# Patient Record
Sex: FEMALE | Race: WHITE | NOT HISPANIC OR LATINO
[De-identification: names, ages, dates, MRNs, and addresses within clinical notes are randomized per-mention and may not be internally consistent; named-entity substitution may affect disease eponyms.]

---

## 2018-10-31 ENCOUNTER — TRANSCRIPTION ENCOUNTER (OUTPATIENT)
Age: 73
End: 2018-10-31

## 2019-11-29 ENCOUNTER — TRANSCRIPTION ENCOUNTER (OUTPATIENT)
Age: 74
End: 2019-11-29

## 2020-01-17 ENCOUNTER — TRANSCRIPTION ENCOUNTER (OUTPATIENT)
Age: 75
End: 2020-01-17

## 2023-01-03 ENCOUNTER — NON-APPOINTMENT (OUTPATIENT)
Age: 78
End: 2023-01-03

## 2023-01-04 ENCOUNTER — INPATIENT (INPATIENT)
Facility: HOSPITAL | Age: 78
LOS: 2 days | Discharge: HOME | End: 2023-01-07
Attending: HOSPITALIST | Admitting: HOSPITALIST
Payer: COMMERCIAL

## 2023-01-04 VITALS
DIASTOLIC BLOOD PRESSURE: 91 MMHG | RESPIRATION RATE: 18 BRPM | OXYGEN SATURATION: 97 % | SYSTOLIC BLOOD PRESSURE: 150 MMHG | HEART RATE: 130 BPM | WEIGHT: 169.98 LBS | TEMPERATURE: 97 F

## 2023-01-04 LAB
ALBUMIN SERPL ELPH-MCNC: 5 G/DL — SIGNIFICANT CHANGE UP (ref 3.5–5.2)
ALP SERPL-CCNC: 117 U/L — HIGH (ref 30–115)
ALT FLD-CCNC: 34 U/L — SIGNIFICANT CHANGE UP (ref 0–41)
ANION GAP SERPL CALC-SCNC: 13 MMOL/L — SIGNIFICANT CHANGE UP (ref 7–14)
AST SERPL-CCNC: 23 U/L — SIGNIFICANT CHANGE UP (ref 0–41)
BASOPHILS # BLD AUTO: 0.05 K/UL — SIGNIFICANT CHANGE UP (ref 0–0.2)
BASOPHILS NFR BLD AUTO: 0.8 % — SIGNIFICANT CHANGE UP (ref 0–1)
BILIRUB SERPL-MCNC: 1.4 MG/DL — HIGH (ref 0.2–1.2)
BUN SERPL-MCNC: 17 MG/DL — SIGNIFICANT CHANGE UP (ref 10–20)
CALCIUM SERPL-MCNC: 9.9 MG/DL — SIGNIFICANT CHANGE UP (ref 8.4–10.4)
CHLORIDE SERPL-SCNC: 103 MMOL/L — SIGNIFICANT CHANGE UP (ref 98–110)
CO2 SERPL-SCNC: 26 MMOL/L — SIGNIFICANT CHANGE UP (ref 17–32)
CREAT SERPL-MCNC: 0.8 MG/DL — SIGNIFICANT CHANGE UP (ref 0.7–1.5)
EGFR: 77 ML/MIN/1.73M2 — SIGNIFICANT CHANGE UP
EOSINOPHIL # BLD AUTO: 0.17 K/UL — SIGNIFICANT CHANGE UP (ref 0–0.7)
EOSINOPHIL NFR BLD AUTO: 2.6 % — SIGNIFICANT CHANGE UP (ref 0–8)
FLUAV AG NPH QL: SIGNIFICANT CHANGE UP
FLUBV AG NPH QL: SIGNIFICANT CHANGE UP
GLUCOSE SERPL-MCNC: 120 MG/DL — HIGH (ref 70–99)
HCT VFR BLD CALC: 41 % — SIGNIFICANT CHANGE UP (ref 37–47)
HGB BLD-MCNC: 14.5 G/DL — SIGNIFICANT CHANGE UP (ref 12–16)
IMM GRANULOCYTES NFR BLD AUTO: 0.3 % — SIGNIFICANT CHANGE UP (ref 0.1–0.3)
LYMPHOCYTES # BLD AUTO: 2.04 K/UL — SIGNIFICANT CHANGE UP (ref 1.2–3.4)
LYMPHOCYTES # BLD AUTO: 31.1 % — SIGNIFICANT CHANGE UP (ref 20.5–51.1)
MCHC RBC-ENTMCNC: 30.1 PG — SIGNIFICANT CHANGE UP (ref 27–31)
MCHC RBC-ENTMCNC: 35.4 G/DL — SIGNIFICANT CHANGE UP (ref 32–37)
MCV RBC AUTO: 85.1 FL — SIGNIFICANT CHANGE UP (ref 81–99)
MONOCYTES # BLD AUTO: 0.54 K/UL — SIGNIFICANT CHANGE UP (ref 0.1–0.6)
MONOCYTES NFR BLD AUTO: 8.2 % — SIGNIFICANT CHANGE UP (ref 1.7–9.3)
NEUTROPHILS # BLD AUTO: 3.74 K/UL — SIGNIFICANT CHANGE UP (ref 1.4–6.5)
NEUTROPHILS NFR BLD AUTO: 57 % — SIGNIFICANT CHANGE UP (ref 42.2–75.2)
NRBC # BLD: 0 /100 WBCS — SIGNIFICANT CHANGE UP (ref 0–0)
NT-PROBNP SERPL-SCNC: 952 PG/ML — HIGH (ref 0–300)
PLATELET # BLD AUTO: 280 K/UL — SIGNIFICANT CHANGE UP (ref 130–400)
POTASSIUM SERPL-MCNC: 2.9 MMOL/L — LOW (ref 3.5–5)
POTASSIUM SERPL-SCNC: 2.9 MMOL/L — LOW (ref 3.5–5)
PROT SERPL-MCNC: 7.7 G/DL — SIGNIFICANT CHANGE UP (ref 6–8)
RBC # BLD: 4.82 M/UL — SIGNIFICANT CHANGE UP (ref 4.2–5.4)
RBC # FLD: 13.5 % — SIGNIFICANT CHANGE UP (ref 11.5–14.5)
RSV RNA NPH QL NAA+NON-PROBE: SIGNIFICANT CHANGE UP
SARS-COV-2 RNA SPEC QL NAA+PROBE: SIGNIFICANT CHANGE UP
SODIUM SERPL-SCNC: 142 MMOL/L — SIGNIFICANT CHANGE UP (ref 135–146)
TROPONIN T SERPL-MCNC: <0.01 NG/ML — SIGNIFICANT CHANGE UP
WBC # BLD: 6.56 K/UL — SIGNIFICANT CHANGE UP (ref 4.8–10.8)
WBC # FLD AUTO: 6.56 K/UL — SIGNIFICANT CHANGE UP (ref 4.8–10.8)

## 2023-01-04 PROCEDURE — 71045 X-RAY EXAM CHEST 1 VIEW: CPT | Mod: 26

## 2023-01-04 PROCEDURE — 99285 EMERGENCY DEPT VISIT HI MDM: CPT

## 2023-01-04 PROCEDURE — 93010 ELECTROCARDIOGRAM REPORT: CPT

## 2023-01-04 RX ORDER — SODIUM CHLORIDE 9 MG/ML
1000 INJECTION INTRAMUSCULAR; INTRAVENOUS; SUBCUTANEOUS ONCE
Refills: 0 | Status: COMPLETED | OUTPATIENT
Start: 2023-01-04 | End: 2023-01-04

## 2023-01-04 RX ORDER — DILTIAZEM HCL 120 MG
20 CAPSULE, EXT RELEASE 24 HR ORAL ONCE
Refills: 0 | Status: COMPLETED | OUTPATIENT
Start: 2023-01-04 | End: 2023-01-04

## 2023-01-04 RX ORDER — DILTIAZEM HCL 120 MG
30 CAPSULE, EXT RELEASE 24 HR ORAL ONCE
Refills: 0 | Status: COMPLETED | OUTPATIENT
Start: 2023-01-04 | End: 2023-01-04

## 2023-01-04 RX ORDER — POTASSIUM CHLORIDE 20 MEQ
20 PACKET (EA) ORAL
Refills: 0 | Status: COMPLETED | OUTPATIENT
Start: 2023-01-04 | End: 2023-01-05

## 2023-01-04 RX ORDER — POTASSIUM CHLORIDE 20 MEQ
40 PACKET (EA) ORAL ONCE
Refills: 0 | Status: COMPLETED | OUTPATIENT
Start: 2023-01-04 | End: 2023-01-04

## 2023-01-04 RX ORDER — DILTIAZEM HCL 120 MG
5 CAPSULE, EXT RELEASE 24 HR ORAL
Qty: 125 | Refills: 0 | Status: DISCONTINUED | OUTPATIENT
Start: 2023-01-04 | End: 2023-01-05

## 2023-01-04 RX ADMIN — Medication 5 MG/HR: at 21:10

## 2023-01-04 RX ADMIN — Medication 40 MILLIEQUIVALENT(S): at 23:38

## 2023-01-04 RX ADMIN — Medication 50 MILLIEQUIVALENT(S): at 23:34

## 2023-01-04 RX ADMIN — Medication 30 MILLIGRAM(S): at 21:00

## 2023-01-04 RX ADMIN — SODIUM CHLORIDE 1000 MILLILITER(S): 9 INJECTION INTRAMUSCULAR; INTRAVENOUS; SUBCUTANEOUS at 20:33

## 2023-01-04 RX ADMIN — Medication 20 MILLIGRAM(S): at 20:33

## 2023-01-04 NOTE — ED ADULT TRIAGE NOTE - CHIEF COMPLAINT QUOTE
pt biba complaining of shortness of breath for about a week now. pt went to the urgent care where they did an EKG and found her to be on a fib at 140 bpm

## 2023-01-04 NOTE — ED PROVIDER NOTE - ATTENDING CONTRIBUTION TO CARE
78 yo f hx htn  pt presents for eval of exertional sob. sx began several days ago. no cp/syncope.  no previous cardiac hx.  pt went to [facility] and was found to be in rapid afib to 140s and ref to ed. no reported thyroid disease, recent illness, leg swelling, pleuritic cp     vs tachycardic  gen- NAD, aaox3  card-irir  lungs-ctab, no wheezing or rhonchi  abd-sntnd, no guarding or rebound  neuro- full str/sensation, cn ii-xii grossly intact, normal coordination    new onset rapid afib  will treat w/ cardizem ivp > drip/oral pending response  BP stable  screening labs

## 2023-01-04 NOTE — ED PROVIDER NOTE - PHYSICAL EXAMINATION
CONSTITUTIONAL: nontoxic appearing, in no acute distress  HEAD:  normocephalic, atraumatic  EYES:  no conjunctival injection, no eye discharge, tracking well  ENT:  tympanic membranes intact bilaterally, moist mucous membranes, no oropharyngeal ulcerations or lesions, no tonsillar swelling or erythema, no tonsillar exudates  NECK:  supple, no masses, no tender anterior/posterior cervical lymphadenopathy  CV:  rapid afib, cap refill < 2 seconds  RESP:  normal respiratory effort, lungs clear to auscultation bilaterally, no wheezes, no crackles, no retractions, no stridor  ABD:  soft, nontender, nondistended, no masses, no organomegaly  LYMPH:  no significant lymphadenopathy  MSK/NEURO:  normal movement, normal tone  SKIN:  warm, dry, no rash

## 2023-01-04 NOTE — ED PROVIDER NOTE - CONSIDERATION OF ADMISSION OBSERVATION
Consideration of Admission/Observation ED observation period on telemetry as rate control is given  likely tele admission given new onset

## 2023-01-04 NOTE — ED PROVIDER NOTE - CLINICAL SUMMARY MEDICAL DECISION MAKING FREE TEXT BOX
rate improving w/ dilt pushes and drip  labs w/ hypoK, bnp elevated, xr congested  will admit to med tele for new onset afib, suspect new onset chf

## 2023-01-04 NOTE — ED PROVIDER NOTE - CARE PLAN
1 Principal Discharge DX:	New onset atrial fibrillation  Secondary Diagnosis:	Shortness of breath  Secondary Diagnosis:	Hypokalemia

## 2023-01-04 NOTE — ED PROVIDER NOTE - OBJECTIVE STATEMENT
77 y F pmhx htn 76 yo f hx htn presents for eval of exertional sob. pt states sxs started 2days ago after she received her flu/covid vaccines. pt denies cp/syncope. pt went to  and was found to be in rapid afib HR 140s and sent to ED for further work up. no reported thyroid disease, recent illness, leg swelling, pleuritic cp 76 yo f hx htn presents for eval of exertional sob. pt states sxs started 2days ago after she received her flu/covid vaccines. pt denies cp/syncope. pt went to  and was found to be in rapid afib HR 140s and sent to ED for further work up. no reported thyroid disease, recent illness, leg swelling, pleuritic cp

## 2023-01-05 LAB
A1C WITH ESTIMATED AVERAGE GLUCOSE RESULT: 6.1 % — HIGH (ref 4–5.6)
ALBUMIN SERPL ELPH-MCNC: 4.4 G/DL — SIGNIFICANT CHANGE UP (ref 3.5–5.2)
ALP SERPL-CCNC: 105 U/L — SIGNIFICANT CHANGE UP (ref 30–115)
ALT FLD-CCNC: 28 U/L — SIGNIFICANT CHANGE UP (ref 0–41)
ANION GAP SERPL CALC-SCNC: 16 MMOL/L — HIGH (ref 7–14)
AST SERPL-CCNC: 22 U/L — SIGNIFICANT CHANGE UP (ref 0–41)
BILIRUB DIRECT SERPL-MCNC: 0.3 MG/DL — SIGNIFICANT CHANGE UP (ref 0–0.3)
BILIRUB SERPL-MCNC: 1.4 MG/DL — HIGH (ref 0.2–1.2)
BUN SERPL-MCNC: 13 MG/DL — SIGNIFICANT CHANGE UP (ref 10–20)
CALCIUM SERPL-MCNC: 9.4 MG/DL — SIGNIFICANT CHANGE UP (ref 8.4–10.5)
CHLORIDE SERPL-SCNC: 105 MMOL/L — SIGNIFICANT CHANGE UP (ref 98–110)
CHOLEST SERPL-MCNC: 146 MG/DL — SIGNIFICANT CHANGE UP
CO2 SERPL-SCNC: 20 MMOL/L — SIGNIFICANT CHANGE UP (ref 17–32)
CREAT SERPL-MCNC: 0.7 MG/DL — SIGNIFICANT CHANGE UP (ref 0.7–1.5)
EGFR: 90 ML/MIN/1.73M2 — SIGNIFICANT CHANGE UP
ESTIMATED AVERAGE GLUCOSE: 128 MG/DL — HIGH (ref 68–114)
GLUCOSE SERPL-MCNC: 141 MG/DL — HIGH (ref 70–99)
HCT VFR BLD CALC: 37.8 % — SIGNIFICANT CHANGE UP (ref 37–47)
HCV AB S/CO SERPL IA: 0.2 COI — SIGNIFICANT CHANGE UP
HCV AB SERPL-IMP: SIGNIFICANT CHANGE UP
HDLC SERPL-MCNC: 52 MG/DL — SIGNIFICANT CHANGE UP
HGB BLD-MCNC: 13.5 G/DL — SIGNIFICANT CHANGE UP (ref 12–16)
LIPID PNL WITH DIRECT LDL SERPL: 69 MG/DL — SIGNIFICANT CHANGE UP
MAGNESIUM SERPL-MCNC: 1.8 MG/DL — SIGNIFICANT CHANGE UP (ref 1.8–2.4)
MCHC RBC-ENTMCNC: 30.1 PG — SIGNIFICANT CHANGE UP (ref 27–31)
MCHC RBC-ENTMCNC: 35.7 G/DL — SIGNIFICANT CHANGE UP (ref 32–37)
MCV RBC AUTO: 84.4 FL — SIGNIFICANT CHANGE UP (ref 81–99)
NON HDL CHOLESTEROL: 94 MG/DL — SIGNIFICANT CHANGE UP
NRBC # BLD: 0 /100 WBCS — SIGNIFICANT CHANGE UP (ref 0–0)
PLATELET # BLD AUTO: 235 K/UL — SIGNIFICANT CHANGE UP (ref 130–400)
POTASSIUM SERPL-MCNC: 3.9 MMOL/L — SIGNIFICANT CHANGE UP (ref 3.5–5)
POTASSIUM SERPL-SCNC: 3.9 MMOL/L — SIGNIFICANT CHANGE UP (ref 3.5–5)
PROT SERPL-MCNC: 6.9 G/DL — SIGNIFICANT CHANGE UP (ref 6–8)
RBC # BLD: 4.48 M/UL — SIGNIFICANT CHANGE UP (ref 4.2–5.4)
RBC # FLD: 13.5 % — SIGNIFICANT CHANGE UP (ref 11.5–14.5)
SODIUM SERPL-SCNC: 141 MMOL/L — SIGNIFICANT CHANGE UP (ref 135–146)
TRIGL SERPL-MCNC: 123 MG/DL — SIGNIFICANT CHANGE UP
TSH SERPL-MCNC: 4.79 UIU/ML — HIGH (ref 0.27–4.2)
WBC # BLD: 6.4 K/UL — SIGNIFICANT CHANGE UP (ref 4.8–10.8)
WBC # FLD AUTO: 6.4 K/UL — SIGNIFICANT CHANGE UP (ref 4.8–10.8)

## 2023-01-05 PROCEDURE — 93312 ECHO TRANSESOPHAGEAL: CPT | Mod: 26,XU

## 2023-01-05 PROCEDURE — 93320 DOPPLER ECHO COMPLETE: CPT | Mod: 26

## 2023-01-05 PROCEDURE — 93325 DOPPLER ECHO COLOR FLOW MAPG: CPT | Mod: 26

## 2023-01-05 PROCEDURE — 99223 1ST HOSP IP/OBS HIGH 75: CPT

## 2023-01-05 PROCEDURE — 92960 CARDIOVERSION ELECTRIC EXT: CPT

## 2023-01-05 RX ORDER — LATANOPROST 0.05 MG/ML
1 SOLUTION/ DROPS OPHTHALMIC; TOPICAL AT BEDTIME
Refills: 0 | Status: DISCONTINUED | OUTPATIENT
Start: 2023-01-05 | End: 2023-01-07

## 2023-01-05 RX ORDER — ENOXAPARIN SODIUM 100 MG/ML
80 INJECTION SUBCUTANEOUS EVERY 12 HOURS
Refills: 0 | Status: DISCONTINUED | OUTPATIENT
Start: 2023-01-05 | End: 2023-01-06

## 2023-01-05 RX ORDER — LOSARTAN POTASSIUM 100 MG/1
100 TABLET, FILM COATED ORAL DAILY
Refills: 0 | Status: DISCONTINUED | OUTPATIENT
Start: 2023-01-05 | End: 2023-01-07

## 2023-01-05 RX ORDER — LANOLIN ALCOHOL/MO/W.PET/CERES
3 CREAM (GRAM) TOPICAL AT BEDTIME
Refills: 0 | Status: DISCONTINUED | OUTPATIENT
Start: 2023-01-05 | End: 2023-01-07

## 2023-01-05 RX ORDER — AMLODIPINE BESYLATE 2.5 MG/1
5 TABLET ORAL DAILY
Refills: 0 | Status: DISCONTINUED | OUTPATIENT
Start: 2023-01-05 | End: 2023-01-05

## 2023-01-05 RX ORDER — ACETAMINOPHEN 500 MG
650 TABLET ORAL EVERY 6 HOURS
Refills: 0 | Status: DISCONTINUED | OUTPATIENT
Start: 2023-01-05 | End: 2023-01-07

## 2023-01-05 RX ORDER — LATANOPROST 0.05 MG/ML
1 SOLUTION/ DROPS OPHTHALMIC; TOPICAL AT BEDTIME
Refills: 0 | Status: DISCONTINUED | OUTPATIENT
Start: 2023-01-05 | End: 2023-01-05

## 2023-01-05 RX ORDER — DILTIAZEM HCL 120 MG
15 CAPSULE, EXT RELEASE 24 HR ORAL
Qty: 125 | Refills: 0 | Status: DISCONTINUED | OUTPATIENT
Start: 2023-01-05 | End: 2023-01-06

## 2023-01-05 RX ORDER — FUROSEMIDE 40 MG
40 TABLET ORAL DAILY
Refills: 0 | Status: DISCONTINUED | OUTPATIENT
Start: 2023-01-05 | End: 2023-01-06

## 2023-01-05 RX ORDER — DILTIAZEM HCL 120 MG
60 CAPSULE, EXT RELEASE 24 HR ORAL THREE TIMES A DAY
Refills: 0 | Status: DISCONTINUED | OUTPATIENT
Start: 2023-01-05 | End: 2023-01-05

## 2023-01-05 RX ORDER — AMIODARONE HYDROCHLORIDE 400 MG/1
1 TABLET ORAL
Qty: 900 | Refills: 0 | Status: DISCONTINUED | OUTPATIENT
Start: 2023-01-05 | End: 2023-01-06

## 2023-01-05 RX ORDER — ENOXAPARIN SODIUM 100 MG/ML
80 INJECTION SUBCUTANEOUS EVERY 12 HOURS
Refills: 0 | Status: DISCONTINUED | OUTPATIENT
Start: 2023-01-05 | End: 2023-01-05

## 2023-01-05 RX ORDER — AMIODARONE HYDROCHLORIDE 400 MG/1
0.5 TABLET ORAL
Qty: 900 | Refills: 0 | Status: DISCONTINUED | OUTPATIENT
Start: 2023-01-05 | End: 2023-01-06

## 2023-01-05 RX ORDER — ATORVASTATIN CALCIUM 80 MG/1
20 TABLET, FILM COATED ORAL AT BEDTIME
Refills: 0 | Status: DISCONTINUED | OUTPATIENT
Start: 2023-01-05 | End: 2023-01-07

## 2023-01-05 RX ADMIN — Medication 15 MG/HR: at 07:27

## 2023-01-05 RX ADMIN — AMIODARONE HYDROCHLORIDE 33.3 MG/MIN: 400 TABLET ORAL at 18:15

## 2023-01-05 RX ADMIN — Medication 650 MILLIGRAM(S): at 05:29

## 2023-01-05 RX ADMIN — Medication 50 MILLIEQUIVALENT(S): at 05:20

## 2023-01-05 RX ADMIN — ENOXAPARIN SODIUM 80 MILLIGRAM(S): 100 INJECTION SUBCUTANEOUS at 05:21

## 2023-01-05 RX ADMIN — ATORVASTATIN CALCIUM 20 MILLIGRAM(S): 80 TABLET, FILM COATED ORAL at 21:29

## 2023-01-05 RX ADMIN — ENOXAPARIN SODIUM 80 MILLIGRAM(S): 100 INJECTION SUBCUTANEOUS at 18:15

## 2023-01-05 RX ADMIN — LOSARTAN POTASSIUM 100 MILLIGRAM(S): 100 TABLET, FILM COATED ORAL at 05:21

## 2023-01-05 RX ADMIN — Medication 15 MG/HR: at 02:37

## 2023-01-05 RX ADMIN — Medication 60 MILLIGRAM(S): at 05:20

## 2023-01-05 RX ADMIN — LATANOPROST 1 DROP(S): 0.05 SOLUTION/ DROPS OPHTHALMIC; TOPICAL at 21:26

## 2023-01-05 RX ADMIN — Medication 40 MILLIGRAM(S): at 05:20

## 2023-01-05 RX ADMIN — Medication 50 MILLIEQUIVALENT(S): at 02:37

## 2023-01-05 NOTE — H&P ADULT - NSHPPOAURINARYCATHETER_GEN_ALL_CORE
Did she test + for COVID?  Is she having fever.  If she is more than 10 days out from when she was tested, and not having fever, then have her see me this week.  If she hasn't been tested, then probably should go through urgent care.    no

## 2023-01-05 NOTE — H&P ADULT - HISTORY OF PRESENT ILLNESS
This is a 77-year-old female patient with history of HTN, DL, and glaucoma presenting for 1 week history of shortness of breath, Last week she had the flu short and Covid booster at the same time, after that she started having SOB both at rest and on exertion. She denies any prior cardiac or pulm history. She's a non smoker, denies alcohol or drug hx, No chest pain, no palpitation, no other associated symptoms.     In the ED she was found to have new onset Afib with RVR, s/p Cardizem pushes and started on cardizem drip.  Vitals otherwise stable  CXR on wet read showed bilateral pleural effusion  EKG: Afib with RVR with non specific ST and T wave abnormalities   trops negative  Labs showed: K2.9, Bilirubin 1.4, , Pro-BNP 900s    Patient admitted to [facility] for monitoring and management of new onset Afib and r/o acute CHF. This is a 77-year-old female patient with history of HTN, DL, and glaucoma presenting for 1 week history of shortness of breath, Last week she had the flu vaccine and Covid booster at the same time, after that she started having SOB both at rest and on exertion. She denies any prior cardiac or pulm history. She's a non smoker, denies alcohol or drug hx, No cough, fever, chest pain, palpitations, or any other symptom.     In the ED she was found to have new onset Afib with RVR, s/p Cardizem pushes and started on cardizem drip.  Vitals otherwise stable  CXR on wet read showed bilateral pleural effusion  EKG: Afib with RVR with non specific ST and T wave abnormalities   trops negative  Labs showed: K2.9, Bilirubin 1.4, , Pro-BNP 900s    Patient admitted to [facility] for monitoring and management of new onset Afib and r/o acute CHF.

## 2023-01-05 NOTE — CHART NOTE - NSCHARTNOTEFT_GEN_A_CORE
POST OPERATIVE PROCEDURAL DOCUMENTATION  PRE-OP DIAGNOSIS: Afib with RVR    POST-OP DIAGNOSIS: Afib with successful DCCV to NSR    PROCEDURE: Transesophageal echocardiogram    Primary Physician: Dr. Stoddard  Assistant: Dr. Navarro    ANESTHESIA TYPE  [  ] General Anesthesia  [ x ] Conscious Sedation  [  ] Local/Regional    CONDITION  [  ] Critical  [  ] Serious  [  ] Fair  [ X ] Good    SPECIMENS REMOVED (IF APPLICABLE): N/A    IMPLANTS (IF APPLICABLE): None    ESTIMATED BLOOD LOSS: None    COMPLICATIONS: None      FINDINGS:    After risks and benefits of procedures were explained, informed consent was obtained and placed in chart.   The patient received topical anesthestic to the oropharynx with viscous lidocaine and benzocaine spray.  Refer to Anesthesia note for sedation details.  The DAT probe was passed into the esophagus without difficulty.  Transesophageal images were obtained.  The DAT probe was removed without difficulty and examined.  There was no evidence for bleeding.  The patient tolerated the procedure well without any immediate DAT-related complications.      Preliminary Findings:  PRABHA: Left atrial appendage was clear of clot and smoke.  LA/RA: Biatrial enlargement L>R b  LV: LVEF was estimated at 50%  MV: Moderate MR with some prolapse of P2, No evidence for MS.   AV: Moderate AI, no evidence for AS.   TV: Moderate TR.   IAS: ASD present with L->R shunt.  Trace pericardial effusion  L pleural effusion   There was mild, non-mobile atheroma seen in the thoracic aorta.     Patient successfully converted to sinus rhythm with synchronized  200 J x 2 of direct current cardioversion and amiodarone 150mg IVP    DIAGNOSIS/IMPRESSION: Afib with successful DCCV to NSR    PLAN OF CARE:  - s/p amiodarone 150mg IVP x 1. Will need to cont amiodarone loading  - CHADVASC 4. Will need to cont Anticoagulation  - F/U with EP for further recommendation  - Will need general cardiology follow up for ASD and valvular disease

## 2023-01-05 NOTE — H&P ADULT - ASSESSMENT
This is a 77-year-old female patient with history of HTN, DL, and glaucoma presenting for 1 week history of shortness of breath, Last week she had the flu short and Covid booster at the same time, after that she started having SOB both at rest and on exertion.  Patient admitted to [facility] for monitoring and management of new onset Afib and r/o acute CHF.      Plan:    #SOB  #New onset Afib with RVR  - s/p Cardizem pushes and drip at 15mg/hr in the ED--> transition to PO cardizem and wean off drip  - CHADSVASC: 4   - HAS-BLED: 1  - AC --> started lovenox 80 BID  - f/u TTE  - f/u TSH  - f/u EP consult      #r/o Acute CHF  - patient doesn't have a cardiac hx  - CXR on admission showed bilateral pleural effusion on wet read   - Pro BNP 900s  - started lasix 40 IV qd  - TTE ordered. If wall motion abnormalities found consider ischemic workup  - Accurate Is/Os and daily weight    #hypokalemia  - patient reports having chronic hypokalemia  - replete as necessary      #HTN  - Cw home amlodipine 5 daily, Losartan 100,   - holding HTZ in the setting of hypokalemia     #DL  - cw home atorvastatin 20 dailu  - f/u am Lipid profil and A1c    #Glaucoma  - Cw lumigan once daily  -o/p f/u      #MISC  - DVT PPx: Lovenox 80 BID  - GI PPx: low risk  - Diet: NPO until evaluated by EP   - Activity: IAT  - Code: Full code   - Dispo: Acute. Med [place]. This is a 77-year-old female patient with history of HTN, DL, and glaucoma presenting for 1 week history of shortness of breath, Last week she had the flu short and Covid booster at the same time, after that she started having SOB both at rest and on exertion.  Patient admitted to [facility] for monitoring and management of new onset Afib and r/o acute CHF.      Plan:    #SOB  #New onset Afib with RVR  - s/p Cardizem pushes and drip at 15mg/hr in the ED--> transition to PO cardizem and wean off drip  - CHADSVASC: 4   - HAS-BLED: 1  - AC --> started lovenox 80 BID  - f/u TTE  - f/u TSH  - f/u EP consult      #r/o Acute CHF  - patient doesn't have a cardiac hx  - CXR on admission showed bilateral pleural effusion on wet read   - Pro BNP 900s  - started lasix 40 IV qd  - no prior echo. TTE ordered. If wall motion abnormalities found consider ischemic workup  - Accurate Is/Os and daily weight    #Hypokalemia  - patient reports having chronic hypokalemia  - replete as necessary    #Elevated Total bilirubin and ALP  - trend LFTs. f/u direct bili  - patient asx.   - If trending up--> RUQ US      #HTN  #DL  - Cw home amlodipine 5 daily, Losartan 100,   - holding HTZ in the setting of hypokalemia   - cw home atorvastatin 20 dailu  - f/u am Lipid profil and A1c      #Glaucoma  - Cw lumigan once daily  -o/p f/u      #MISC  - DVT PPx: Lovenox 80 BID  - GI PPx: low risk  - Diet: NPO until evaluated by EP   - Activity: IAT  - Code: Full code   - Dispo: Acute. Med tele. This is a 77-year-old female patient with history of HTN, DL, and glaucoma presenting for 1 week history of shortness of breath, Last week she had the flu short and Covid booster at the same time, after that she started having SOB both at rest and on exertion.  Patient admitted to [facility] for monitoring and management of new onset Afib and r/o acute CHF.      Plan:    #SOB  #New onset Afib with RVR  - s/p Cardizem pushes and drip at 15mg/hr in the ED--> transition to PO Cardizem 60 tid (increase as needed) and wean off drip  - CHADSVASC: 4   - HAS-BLED: 1  - AC --> started lovenox 80 BID  - f/u TTE  - f/u TSH  - f/u EP consult      #r/o Acute CHF  - patient doesn't have a cardiac PMH.   - CXR on admission showed bilateral pleural effusion on wet read   - Pro BNP 900s  - started lasix 40 IV qd  - no prior echo. TTE ordered. If wall motion abnormalities found consider ischemic workup.  - Accurate Is/Os and daily weight      #Hypokalemia  - patient reports having chronic hypokalemia, never treated. replete as necessary    #Elevated Total bilirubin and ALP  - trend LFTs. f/u direct bili  - patient asx.   - If trending up or patient becomes symptomatic --> RUQ US      #HTN  #DL  - Cw home Losartan 100,   - holding HTZ in the setting of hypokalemia. Holding home amlodipine, started cardizem.  - cw home atorvastatin 20 daily  - f/u am Lipid profile and A1c      #Glaucoma  - Cw lumigan once daily  -o/p f/u      #MISC  - DVT PPx: Lovenox 80 BID  - GI PPx: low risk  - Diet: NPO until evaluated by EP   - Activity: IAT  - Code: Full code   - Dispo: Acute. Med tele.

## 2023-01-05 NOTE — H&P ADULT - NSHPLABSRESULTS_GEN_ALL_CORE
14.5   6.56  )-----------( 280      ( 04 Jan 2023 20:38 )             41.0     01-04    142  |  103  |  17  ----------------------------<  120<H>  2.9<L>   |  26  |  0.8    Ca    9.9      04 Jan 2023 20:38    TPro  7.7  /  Alb  5.0  /  TBili  1.4<H>  /  DBili  x   /  AST  23  /  ALT  34  /  AlkPhos  117<H>  01-04          Troponin T, Serum: <0.01 ng/mL (01-04-23 @ 20:38)      CARDIAC MARKERS ( 04 Jan 2023 20:38 )  x     / <0.01 ng/mL / x     / x     / x

## 2023-01-05 NOTE — CONSULT NOTE ADULT - SUBJECTIVE AND OBJECTIVE BOX
HISTORY OF PRESENT ILLNESS:   77-year-old female patient with history of HTN, DL, and glaucoma presenting for 1 week history of shortness of breath, Last week she had the flu vaccine and Covid booster at the same time, after that she started having SOB both at rest and on exertion. She denies any prior cardiac or pulm history. She's a non smoker, denies alcohol or drug hx, No cough, fever, chest pain, palpitations, or any other symptom.     In the ED she was found to have new onset Afib with RVR, s/p Cardizem pushes and started on cardizem drip.      PAST MEDICAL & SURGICAL HISTORY      FAMILY HISTORY:  FAMILY HISTORY:      SOCIAL HISTORY:  []smoker  []Alcohol  []Drug    ROS:  Negative except as mentioned in HPI    ALLERGIES:  No Known Allergies      MEDICATIONS:  MEDICATIONS  (STANDING):  atorvastatin 20 milliGRAM(s) Oral at bedtime  diltiazem    Tablet 60 milliGRAM(s) Oral three times a day  diltiazem Infusion 15 mG/Hr (15 mL/Hr) IV Continuous <Continuous>  enoxaparin Injectable 80 milliGRAM(s) SubCutaneous every 12 hours  furosemide   Injectable 40 milliGRAM(s) IV Push daily  latanoprost 0.005% Ophthalmic Solution 1 Drop(s) Both EYES at bedtime  losartan 100 milliGRAM(s) Oral daily    MEDICATIONS  (PRN):  acetaminophen     Tablet .. 650 milliGRAM(s) Oral every 6 hours PRN Temp greater or equal to 38C (100.4F), Mild Pain (1 - 3)  melatonin 3 milliGRAM(s) Oral at bedtime PRN Insomnia      HOME MEDICATIONS:  Home Medications:  amLODIPine 5 mg oral tablet: 1 tab(s) orally once a day (05 Jan 2023 02:05)  atorvastatin 20 mg oral tablet: 1 tab(s) orally once a day (05 Jan 2023 02:31)  losartan-hydroCHLOROthiazide 100 mg-25 mg oral tablet: 1 tab(s) orally once a day (05 Jan 2023 02:05)  Lumigan 0.01% ophthalmic solution: 1 drop(s) to each affected eye once a day (in the evening) (05 Jan 2023 02:04)      VITALS:   T(F): 97 (01-05 @ 08:07), Max: 97.3 (01-04 @ 20:01)  HR: 124 (01-05 @ 08:07) (103 - 138)  BP: 133/96 (01-05 @ 08:07) (127/91 - 152/92)  BP(mean): 109 (01-05 @ 08:07) (106 - 109)  RR: 17 (01-05 @ 08:07) (17 - 18)  SpO2: 95% (01-05 @ 08:07) (95% - 97%)    I&O's Summary    04 Jan 2023 07:01  -  05 Jan 2023 07:00  --------------------------------------------------------  IN: 80 mL / OUT: 600 mL / NET: -520 mL    05 Jan 2023 07:01  -  05 Jan 2023 09:06  --------------------------------------------------------  IN: 0 mL / OUT: 800 mL / NET: -800 mL        PHYSICAL EXAM:  GEN: Not in acute distress  HEENT: NCAT, PERRL, EOMI  LUNGS: Clear to auscultation bilaterally   CARDIOVASCULAR: tachy  ABD: Soft, non-tender, non-distended  EXT: No WILLIAM  SKIN: Intact  NEURO: AAOx3    LABS:                        13.5   6.40  )-----------( 235      ( 05 Jan 2023 06:30 )             37.8     01-05    141  |  105  |  13  ----------------------------<  141<H>  3.9   |  20  |  0.7    Ca    9.4      05 Jan 2023 06:30  Mg     1.8     01-05    TPro  6.9  /  Alb  4.4  /  TBili  1.4<H>  /  DBili  0.3  /  AST  22  /  ALT  28  /  AlkPhos  105  01-05      Troponin T, Serum: <0.01 ng/mL (01-04-23 @ 20:38)    Troponin <0.01, CKMB --, CK --/ 01-04-23 @ 20:38    Serum Pro-Brain Natriuretic Peptide: 952 pg/mL (01-04-23 @ 20:38)    01-05 Chol 146 LDL -- HDL 52 Trig 123  Hemoglobin A1C   Thyroid    EKG: afib w/ RVR    Tele: afib rate 110s

## 2023-01-05 NOTE — CONSULT NOTE ADULT - ATTENDING COMMENTS
complex patient  New-onset AF  CHADSVASC 4 (Female, HTN, Age)  Recommend anticoagulation  recommend DAT/DCCV  Outpatient f/u with EP (Dr. Moreira)

## 2023-01-05 NOTE — H&P ADULT - ATTENDING COMMENTS
#AFib with RVR, new onset  therapeutic lovenox  dilt gtt at 15  emily/ cardioversion with cards today  npo  cxr with pulm edema  lasix 40 iv  no hypoxia, satting well on ra  #Transaminitis, isolated hyperbilirubinemia  suspect congestive hepatopathy  trend    #Progress Note Handoff:  Pending (specify):  Consults_________, Tests________, Test Results_______, Other__tee/ cardioversion_______  Family discussion: d/w pt at bedside re: treatment plan, primary dx  Disposition: Home_x__/SNF___/Other________/Unknown at this time________

## 2023-01-05 NOTE — PRE-ANESTHESIA EVALUATION ADULT - NSANTHOSAYNRD_GEN_A_CORE
No. ZHAO screening performed.  STOP BANG Legend: 0-2 = LOW Risk; 3-4 = INTERMEDIATE Risk; 5-8 = HIGH Risk

## 2023-01-05 NOTE — PACU DISCHARGE NOTE - COMMENTS
78 y/o y/o F s/p DAT and cardioversion under MAC. No anesthesia related complications.     HR: 80   BP: 96/61   SpO2: 97%   RR: 18

## 2023-01-05 NOTE — H&P ADULT - NSHPPHYSICALEXAM_GEN_ALL_CORE
GENERAL: NAD, well-developed.  HEAD:  Atraumatic, Normocephalic.  EYES: conjunctiva and sclera clear  CHEST/LUNG: GBAE. No wheezing. + Bilateral crackles   HEART: regular rate and rhythm; S1/S2.  ABDOMEN: Soft, Nontender, Nondistended  EXTREMITIES: No edema.   PSYCH: AAOx3.  NEUROLOGY: non-focal; moves all extremities

## 2023-01-06 ENCOUNTER — TRANSCRIPTION ENCOUNTER (OUTPATIENT)
Age: 78
End: 2023-01-06

## 2023-01-06 PROCEDURE — 93306 TTE W/DOPPLER COMPLETE: CPT | Mod: 26

## 2023-01-06 PROCEDURE — 71045 X-RAY EXAM CHEST 1 VIEW: CPT | Mod: 26

## 2023-01-06 PROCEDURE — 93010 ELECTROCARDIOGRAM REPORT: CPT

## 2023-01-06 PROCEDURE — 99233 SBSQ HOSP IP/OBS HIGH 50: CPT

## 2023-01-06 RX ORDER — AMIODARONE HYDROCHLORIDE 400 MG/1
200 TABLET ORAL EVERY 12 HOURS
Refills: 0 | Status: DISCONTINUED | OUTPATIENT
Start: 2023-01-06 | End: 2023-01-07

## 2023-01-06 RX ORDER — MAGNESIUM SULFATE 500 MG/ML
2 VIAL (ML) INJECTION ONCE
Refills: 0 | Status: COMPLETED | OUTPATIENT
Start: 2023-01-06 | End: 2023-01-06

## 2023-01-06 RX ORDER — FUROSEMIDE 40 MG
40 TABLET ORAL EVERY 12 HOURS
Refills: 0 | Status: DISCONTINUED | OUTPATIENT
Start: 2023-01-06 | End: 2023-01-07

## 2023-01-06 RX ORDER — DILTIAZEM HCL 120 MG
30 CAPSULE, EXT RELEASE 24 HR ORAL EVERY 6 HOURS
Refills: 0 | Status: DISCONTINUED | OUTPATIENT
Start: 2023-01-06 | End: 2023-01-06

## 2023-01-06 RX ORDER — APIXABAN 2.5 MG/1
5 TABLET, FILM COATED ORAL EVERY 12 HOURS
Refills: 0 | Status: DISCONTINUED | OUTPATIENT
Start: 2023-01-06 | End: 2023-01-07

## 2023-01-06 RX ORDER — AMIODARONE HYDROCHLORIDE 400 MG/1
200 TABLET ORAL DAILY
Refills: 0 | Status: CANCELLED | OUTPATIENT
Start: 2023-01-13 | End: 2023-01-07

## 2023-01-06 RX ORDER — APIXABAN 2.5 MG/1
1 TABLET, FILM COATED ORAL
Qty: 60 | Refills: 0
Start: 2023-01-06 | End: 2023-02-04

## 2023-01-06 RX ADMIN — ATORVASTATIN CALCIUM 20 MILLIGRAM(S): 80 TABLET, FILM COATED ORAL at 22:25

## 2023-01-06 RX ADMIN — ENOXAPARIN SODIUM 80 MILLIGRAM(S): 100 INJECTION SUBCUTANEOUS at 05:08

## 2023-01-06 RX ADMIN — LOSARTAN POTASSIUM 100 MILLIGRAM(S): 100 TABLET, FILM COATED ORAL at 05:09

## 2023-01-06 RX ADMIN — AMIODARONE HYDROCHLORIDE 200 MILLIGRAM(S): 400 TABLET ORAL at 17:44

## 2023-01-06 RX ADMIN — Medication 650 MILLIGRAM(S): at 20:24

## 2023-01-06 RX ADMIN — Medication 40 MILLIGRAM(S): at 17:44

## 2023-01-06 RX ADMIN — APIXABAN 5 MILLIGRAM(S): 2.5 TABLET, FILM COATED ORAL at 20:23

## 2023-01-06 RX ADMIN — Medication 650 MILLIGRAM(S): at 00:55

## 2023-01-06 RX ADMIN — Medication 650 MILLIGRAM(S): at 07:50

## 2023-01-06 RX ADMIN — Medication 650 MILLIGRAM(S): at 01:45

## 2023-01-06 RX ADMIN — LATANOPROST 1 DROP(S): 0.05 SOLUTION/ DROPS OPHTHALMIC; TOPICAL at 23:00

## 2023-01-06 RX ADMIN — Medication 25 GRAM(S): at 16:04

## 2023-01-06 RX ADMIN — Medication 40 MILLIGRAM(S): at 05:08

## 2023-01-06 RX ADMIN — AMIODARONE HYDROCHLORIDE 200 MILLIGRAM(S): 400 TABLET ORAL at 10:33

## 2023-01-06 NOTE — DISCHARGE NOTE PROVIDER - CARE PROVIDER_API CALL
Eric [address] [place] [address]  Phone: [phone]  Fax: [phone]  Established Patient  Follow Up Time: 2 weeks

## 2023-01-06 NOTE — DISCHARGE NOTE NURSING/CASE MANAGEMENT/SOCIAL WORK - PATIENT PORTAL LINK FT
You can access the FollowMyHealth Patient Portal offered by [facility] by registering at the following website: [email or web address]. By joining [employer]’s FollowMyHealth portal, you will also be able to view your health information using other applications (apps) compatible with our system.

## 2023-01-06 NOTE — PATIENT PROFILE ADULT - FUNCTIONAL ASSESSMENT - BASIC MOBILITY 2.
4 = No assist / stand by assistance Topical Sulfur Applications Pregnancy And Lactation Text: This medication is considered safe during pregnancy and breast feeding secondary to limited systemic absorption.

## 2023-01-06 NOTE — DISCHARGE NOTE PROVIDER - NSDCMRMEDTOKEN_GEN_ALL_CORE_FT
amLODIPine 5 mg oral tablet: 1 tab(s) orally once a day  apixaban 5 mg oral tablet: 1 tab(s) orally every 12 hours  atorvastatin 20 mg oral tablet: 1 tab(s) orally once a day  losartan-hydroCHLOROthiazide 100 mg-25 mg oral tablet: 1 tab(s) orally once a day  Lumigan 0.01% ophthalmic solution: 1 drop(s) to each affected eye once a day (in the evening)   amiodarone 200 mg oral tablet: 1 tab(s) orally every 12 hours till 1/12/2022.   Then,  1 tab(s) orally once a day  starting 1/13/2022.  amLODIPine 5 mg oral tablet: 1 tab(s) orally once a day  apixaban 5 mg oral tablet: 1 tab(s) orally every 12 hours  atorvastatin 20 mg oral tablet: 1 tab(s) orally once a day  furosemide 40 mg oral tablet: 1 tab(s) orally once a day starting 01/08/2023  losartan-hydroCHLOROthiazide 100 mg-25 mg oral tablet: 1 tab(s) orally once a day  Lumigan 0.01% ophthalmic solution: 1 drop(s) to each affected eye once a day (in the evening)

## 2023-01-06 NOTE — PATIENT PROFILE ADULT - NSPROPTRIGHTNOTIFY_GEN_A_NUR
declines

## 2023-01-06 NOTE — DISCHARGE NOTE PROVIDER - NSDCCPCAREPLAN_GEN_ALL_CORE_FT
PRINCIPAL DISCHARGE DIAGNOSIS  Diagnosis: New onset atrial fibrillation  Assessment and Plan of Treatment: You came to the hospital after you were found to have atrail fibrillation or Afib. Afib is a heart problem in which the heart beats much faster than normal. Afib puts people at risk for stroke and other problems.  In afib, the electrical signals that control the heartbeat become abnormal. As a result, the heart's upper chambers stop pumping effectively. Blood that should move out of these chambers gets left behind. As the blood pools, it can start to form clots. These clots can travel up to the brain through the blood vessels, and cause strokes.  During your hospital stay, you were started on Eliquis (apixaban). This medication is a blood-thinner that prevents the formation of dangerous clots in your heart that could lead to a stroke. Additionally, you were started on a beta-blocker medication called metoprolol, which slows down your heart and helps prevent it from beating abnormally fast. You were also started on an anti-arrhythmic medication called amiodarone, which helps prevent the abnormal rhythm from returning. Please continue to take these medications as prescribed once you are discharged from the hospital. Additionally, please follow up with your cardiologist within one week after discharge from the hospital.        SECONDARY DISCHARGE DIAGNOSES  Diagnosis: Shortness of breath  Assessment and Plan of Treatment:     Diagnosis: Hypokalemia  Assessment and Plan of Treatment:

## 2023-01-06 NOTE — PROGRESS NOTE ADULT - SUBJECTIVE AND OBJECTIVE BOX
PUMA SMALLS  77y Female    CHIEF COMPLAINT:    Patient is a 77y old  Female who presents with a chief complaint of SOB (06 Jan 2023 06:52)      INTERVAL HPI/OVERNIGHT EVENTS:    Patient seen and examined.    ROS: All other systems are negative.    Vital Signs:    T(F): 96.8 (01-06-23 @ 03:01), Max: 97.7 (01-05-23 @ 16:40)  HR: 83 (01-06-23 @ 04:00) (70 - 133)  BP: 156/90 (01-06-23 @ 04:00) (119/83 - 156/90)  RR: 16 (01-06-23 @ 04:00) (13 - 18)  SpO2: 95% (01-06-23 @ 04:00) (92% - 99%)  I&O's Summary    05 Jan 2023 07:01  -  06 Jan 2023 07:00  --------------------------------------------------------  IN: 270.2 mL / OUT: 1050 mL / NET: -779.8 mL      Daily Height in cm: 165 (05 Jan 2023 14:15)    Daily   CAPILLARY BLOOD GLUCOSE          PHYSICAL EXAM:    GENERAL:  NAD  SKIN: No rashes or lesions  HENT: Atraumatic. Normocephalic. PERRL. Moist membranes.  NECK: Supple, No JVD. No lymphadenopathy.  PULMONARY: CTA B/L. No wheezing. No rales  CVS: Normal S1, S2. Rate and Rhythm are regular. No murmurs.  ABDOMEN/GI: Soft, Nontender, Nondistended; BS present  EXTREMITIES: Peripheral pulses intact. No edema B/L LE.  NEUROLOGIC:  No motor or sensory deficit.  PSYCH: Alert & oriented x 3    Consultant(s) Notes Reviewed:  [x ] YES  [ ] NO  Care Discussed with Consultants/Other Providers [ x] YES  [ ] NO    EKG reviewed  Telemetry reviewed    LABS:                        13.5   6.40  )-----------( 235      ( 05 Jan 2023 06:30 )             37.8     01-05    141  |  105  |  13  ----------------------------<  141<H>  3.9   |  20  |  0.7    Ca    9.4      05 Jan 2023 06:30  Mg     1.8     01-05    TPro  6.9  /  Alb  4.4  /  TBili  1.4<H>  /  DBili  0.3  /  AST  22  /  ALT  28  /  AlkPhos  105  01-05      Serum Pro-Brain Natriuretic Peptide: 952 pg/mL (01-04-23 @ 20:38)    Trop <0.01, CKMB --, CK --, 01-04-23 @ 20:38        RADIOLOGY & ADDITIONAL TESTS:    < from: Xray Chest 1 View- PORTABLE-Urgent (01.04.23 @ 20:58) >    Impression:    Small bilateral effusions and pulmonary vascular congestion.    < end of copied text >  < from: Transesophageal Echocardiogram (01.05.23 @ 15:26) >    Summary:   1. Left ventricular ejection fraction, by visual estimation, is 50 to   55%.   2. Mild to moderately enlarged right atrium.   3. Moderately enlarged left atrium.   4. Trivial pericardial effusion.   5. Moderate mitral valve regurgitation.   6. Prolpase of posterior leaflet involving P2 scallop.   7. Moderate tricuspid regurgitation.   8. Moderate aortic regurgitation.   9. Moderate aortic valve thickening with normal leaflet opening.  10. Moderately sized secundum atrial septal defect with predominantly   left to right shunting across the atrial septum.  11. Successful DCCV with 200J x 2 direct current.  12. Color Doppler demonstrates the presence of a shunt at the Atrial   level.    < end of copied text >    Imaging or report Personally Reviewed:  [x ] YES  [ ] NO    Medications:  Standing  aMIOdarone Infusion 1 mG/Min IV Continuous <Continuous>  aMIOdarone Infusion 0.5 mG/Min IV Continuous <Continuous>  atorvastatin 20 milliGRAM(s) Oral at bedtime  diltiazem Infusion 15 mG/Hr IV Continuous <Continuous>  enoxaparin Injectable 80 milliGRAM(s) SubCutaneous every 12 hours  furosemide   Injectable 40 milliGRAM(s) IV Push daily  latanoprost 0.005% Ophthalmic Solution 1 Drop(s) Both EYES at bedtime  losartan 100 milliGRAM(s) Oral daily    PRN Meds  acetaminophen     Tablet .. 650 milliGRAM(s) Oral every 6 hours PRN  melatonin 3 milliGRAM(s) Oral at bedtime PRN      Case discussed with resident    Care discussed with pt/family           SLIMPUMA GAUTAM  77y Female    CHIEF COMPLAINT:    Patient is a 77y old  Female who presents with a chief complaint of SOB (06 Jan 2023 06:52)      INTERVAL HPI/OVERNIGHT EVENTS:    Patient seen and examined. S/P DCCV yesterday. Feels better. No palpitations. Denies sob at rest. Fluid overload. No cough or fever.     ROS: All other systems are negative.    Vital Signs:    T(F): 96.8 (01-06-23 @ 03:01), Max: 97.7 (01-05-23 @ 16:40)  HR: 83 (01-06-23 @ 04:00) (70 - 133)  BP: 156/90 (01-06-23 @ 04:00) (119/83 - 156/90)  RR: 16 (01-06-23 @ 04:00) (13 - 18)  SpO2: 95% (01-06-23 @ 04:00) (92% - 99%)  I&O's Summary    05 Jan 2023 07:01  -  06 Jan 2023 07:00  --------------------------------------------------------  IN: 270.2 mL / OUT: 1050 mL / NET: -779.8 mL      Daily Height in cm: 165 (05 Jan 2023 14:15)    Daily   CAPILLARY BLOOD GLUCOSE          PHYSICAL EXAM:    GENERAL:  NAD  SKIN: No rashes or lesions  HENT: Atraumatic. Normocephalic. PERRL. Moist membranes.  NECK: Supple, No JVD. No lymphadenopathy.  PULMONARY: Decreased BS in the bases B/L. No wheezing. No rales  CVS: Normal S1, S2. Rate and Rhythm are regular. No murmurs.  ABDOMEN/GI: Soft, Nontender, Nondistended; BS present  EXTREMITIES: Peripheral pulses intact. No edema B/L LE.  NEUROLOGIC:  No motor or sensory deficit.  PSYCH: Alert & oriented x 3    Consultant(s) Notes Reviewed:  [x ] YES  [ ] NO  Care Discussed with Consultants/Other Providers [ x] YES  [ ] NO    EKG reviewed  Telemetry reviewed    LABS:                        13.5   6.40  )-----------( 235      ( 05 Jan 2023 06:30 )             37.8     01-05    141  |  105  |  13  ----------------------------<  141<H>  3.9   |  20  |  0.7    Ca    9.4      05 Jan 2023 06:30  Mg     1.8     01-05    TPro  6.9  /  Alb  4.4  /  TBili  1.4<H>  /  DBili  0.3  /  AST  22  /  ALT  28  /  AlkPhos  105  01-05      Serum Pro-Brain Natriuretic Peptide: 952 pg/mL (01-04-23 @ 20:38)    Trop <0.01, CKMB --, CK --, 01-04-23 @ 20:38        RADIOLOGY & ADDITIONAL TESTS:    < from: Xray Chest 1 View- PORTABLE-Urgent (01.04.23 @ 20:58) >    Impression:    Small bilateral effusions and pulmonary vascular congestion.    < end of copied text >  < from: Transesophageal Echocardiogram (01.05.23 @ 15:26) >    Summary:   1. Left ventricular ejection fraction, by visual estimation, is 50 to   55%.   2. Mild to moderately enlarged right atrium.   3. Moderately enlarged left atrium.   4. Trivial pericardial effusion.   5. Moderate mitral valve regurgitation.   6. Prolpase of posterior leaflet involving P2 scallop.   7. Moderate tricuspid regurgitation.   8. Moderate aortic regurgitation.   9. Moderate aortic valve thickening with normal leaflet opening.  10. Moderately sized secundum atrial septal defect with predominantly   left to right shunting across the atrial septum.  11. Successful DCCV with 200J x 2 direct current.  12. Color Doppler demonstrates the presence of a shunt at the Atrial   level.    < end of copied text >    Imaging or report Personally Reviewed:  [x ] YES  [ ] NO    Medications:  Standing  aMIOdarone Infusion 1 mG/Min IV Continuous <Continuous>  aMIOdarone Infusion 0.5 mG/Min IV Continuous <Continuous>  atorvastatin 20 milliGRAM(s) Oral at bedtime  diltiazem Infusion 15 mG/Hr IV Continuous <Continuous>  enoxaparin Injectable 80 milliGRAM(s) SubCutaneous every 12 hours  furosemide   Injectable 40 milliGRAM(s) IV Push daily  latanoprost 0.005% Ophthalmic Solution 1 Drop(s) Both EYES at bedtime  losartan 100 milliGRAM(s) Oral daily    PRN Meds  acetaminophen     Tablet .. 650 milliGRAM(s) Oral every 6 hours PRN  melatonin 3 milliGRAM(s) Oral at bedtime PRN      Case discussed with resident    Care discussed with pt/family

## 2023-01-06 NOTE — PATIENT PROFILE ADULT - FALL HARM RISK - HARM RISK INTERVENTIONS
Assistance with ambulation/Assistance OOB with selected safe patient handling equipment/Communicate Risk of Fall with Harm to all staff/Reinforce activity limits and safety measures with patient and family/Tailored Fall Risk Interventions/Use of alarms - bed, chair and/or voice tab/Visual Cue: Yellow wristband and red socks/Bed in lowest position, wheels locked, appropriate side rails in place/Call bell, personal items and telephone in reach/Instruct patient to call for assistance before getting out of bed or chair/Non-slip footwear when patient is out of bed/[place] to call system/Physically safe environment - no spills, clutter or unnecessary equipment/Purposeful Proactive Rounding/Room/bathroom lighting operational, light cord in reach

## 2023-01-06 NOTE — PROGRESS NOTE ADULT - ASSESSMENT
This is a 77-year-old female patient with history of HTN, DL, and glaucoma presenting for 1 week history of shortness of breath, Last week she had the flu short and Covid booster at the same time, after that she started having SOB both at rest and on exertion.  Patient admitted to [facility] for monitoring and management of new onset Afib and r/o acute CHF.      Plan:    #SOB  #New onset Afib with RVR  - s/p Cardizem pushes and drip at 15mg/hr in the ED--> transition to PO Cardizem 60 tid (increase as needed) and wean off drip  - CHADSVASC: 4   - HAS-BLED: 1  - AC --> started lovenox 80 BID  - f/u TTE  - f/u TSH  - f/u EP consult  New-onset AF  CHADSVASC 4 (Female, HTN, Age)  Recommend anticoagulation  recommend DAT/DCCV  Outpatient f/u with EP (Dr. Moreira) .   < from: Transesophageal Echocardiogram (01.05.23 @ 15:26) >   50 to   55%.   2. Mild to moderately enlarged right atrium.   3. Moderately enlarged left atrium.   4. Trivial pericardial effusion.   5. Moderate mitral valve regurgitation.   6. Prolpase of posterior leaflet involving P2 scallop.   7. Moderate tricuspid regurgitation.   8. Moderate aortic regurgitation.   9. Moderate aortic valve thickening with normal leaflet opening.  10. Moderately sized secundum atrial septal defect with predominantly   left to right shunting across the atrial septum.  11. Successful DCCV with 200J x 2 direct current.  12. Color Doppler demonstrates the presence of a shunt at the Atrial   level.    < end of copied text >  PLAN OF CARE:  - s/p amiodarone 150mg IVP x 1. Will need to cont amiodarone loading  - CHADVASC 4. Will need to cont Anticoagulation  - F/U with EP for further recommendation  - Will need general cardiology follow up for ASD and valvular disease.    #r/o Acute CHF  - patient doesn't have a cardiac PMH.   - CXR on admission showed bilateral pleural effusion on wet read   - Pro BNP 900s  - started lasix 40 IV qd  - no prior echo. TTE ordered. If wall motion abnormalities found consider ischemic workup.  - Accurate Is/Os and daily weight      #Hypokalemia  - patient reports having chronic hypokalemia, never treated. replete as necessary    #Elevated Total bilirubin and ALP  - trend LFTs. f/u direct bili  - patient asx.   - If trending up or patient becomes symptomatic --> RUQ US      #HTN  #DL  - Cw home Losartan 100,   - holding HTZ in the setting of hypokalemia. Holding home amlodipine, started cardizem.  - cw home atorvastatin 20 daily  - f/u am Lipid profile and A1c      #Glaucoma  - Cw lumigan once daily  -o/p f/u      #MISC  - DVT PPx: Lovenox 80 BID  - GI PPx: low risk  - Diet: NPO until evaluated by EP   - Activity: IAT  - Code: Full code   - Dispo: Acute. Med tele. This is a 77-year-old female patient with history of HTN, DL, and glaucoma presenting for 1 week history of shortness of breath, Last week she had the flu short and Covid booster at the same time, after that she started having SOB both at rest and on exertion. Pt found to have new onset afib RVR.    #SOB  #New onset Afib with RVR  - s/p Cardizem pushes and drip at 15mg/hr in the ED--> transition to PO Cardizem 60 tid (increase as needed) and wean off drip  - CHADSVASC: 4   - HAS-BLED: 1  - AC --> started lovenox 80 BID  - TSH 4.79H  - EP Following  - S/p DAT/DCCV  - Outpatient f/u with EP (Dr. Moreira) .   < from: Transesophageal Echocardiogram (01.05.23 @ 15:26) >EF 50 to 55%., Moderate mitral valve regurgitation. Moderate tricuspid regurgitation. Moderate aortic regurgitation. Moderate aortic valve thickening with normal leaflet opening. Moderately sized secundum atrial septal defect with predominantly left to right shunting across the atrial septum. Successful DCCV with 200J x 2 direct current. Color Doppler demonstrates the presence of a shunt at the Atrial level.  - s/p amiodarone infusion  - Amiodaron 200 PO BID for 7 days then daily   - Transition AC to eliquis 5,g BID  - Will need general cardiology follow up for ASD and valvular disease.    #Acute CHFpEF  - patient doesn't have a cardiac PMH.   - CXR on admission showed bilateral pleural effusion on wet read   - Pro BNP 900s  - Echo noted above  - Accurate Is/Os and daily weight  - Start her on Lasix 40 mg ivp q 12h  - Low salt diet and water restriction to 1.5 L/C    #Hypokalemia  - patient reports having chronic hypokalemia, never treated. replete as necessary    #Elevated Total bilirubin and ALP  - trend LFTs. f/u direct bili  - patient asx.     #HTN  #DL  - Cw home Losartan 100,   - holding HTZ in the setting of hypokalemia. Holding home amlodipine   - cw home atorvastatin 20 daily  - f/u am Lipid profile and A1c    #Glaucoma  - Cw lumigan once daily (pt directed to bring own meds from home as she endorsed she is sensitive to many other forms of the medication)  -o/p f/u    #MISC  - DVT PPx: Eliquis  - GI PPx: low risk  - Diet: Dash  - Activity: IAT  - Code: Full code

## 2023-01-06 NOTE — PROGRESS NOTE ADULT - SUBJECTIVE AND OBJECTIVE BOX
PUMA SMALLS 77y Female  MRN#: [id number]     Hospital Day: 2d    CC:  NEW ONSET ATRIAL FIBRILLATION; SHORTNESS OF BREATH; HYPOKALEMIA        HOSPITAL COURSE:   77F. PMH: non-smoking, HTN, DLD, and glaucoma presented 1/4 for SOB x1, 1 week prior she had the flu vaccine and Covid booster at the same time, after that she started having SOB both at rest and on exertion. She denies any prior cardiac or pulm history.     In the ED she was found to have new onset Afib with RVR, s/p Cardizem pushes and started on cardizem drip.  Vitals otherwise stable  CXR on wet read showed bilateral pleural effusion  EKG: Afib with RVR with non specific ST and T wave abnormalities   trops negative  Labs showed: K2.9, Bilirubin 1.4, , Pro-    Patient admitted to [facility] for monitoring and management of new onset Afib and r/o acute CHF.    S/p Cardioversion w/ EP  Started on amiodarone infusion, still on CDZM ggt        SUBJECTIVE     Overnight events  None    Subjective complaints                                               ----------------------------------------------------------  OBJECTIVE  PAST MEDICAL & SURGICAL HISTORY  No pertinent past medical history    No significant past surgical history                                              -----------------------------------------------------------  ALLERGIES:  No Known Allergies                                            ------------------------------------------------------------    HOME MEDICATIONS  Home Medications:  amLODIPine 5 mg oral tablet: 1 tab(s) orally once a day (05 Jan 2023 02:05)  atorvastatin 20 mg oral tablet: 1 tab(s) orally once a day (05 Jan 2023 02:31)  losartan-hydroCHLOROthiazide 100 mg-25 mg oral tablet: 1 tab(s) orally once a day (05 Jan 2023 02:05)  Lumigan 0.01% ophthalmic solution: 1 drop(s) to each affected eye once a day (in the evening) (05 Jan 2023 02:04)                           MEDICATIONS:  STANDING MEDICATIONS  aMIOdarone Infusion 1 mG/Min IV Continuous <Continuous>  aMIOdarone Infusion 0.5 mG/Min IV Continuous <Continuous>  atorvastatin 20 milliGRAM(s) Oral at bedtime  diltiazem Infusion 15 mG/Hr IV Continuous <Continuous>  enoxaparin Injectable 80 milliGRAM(s) SubCutaneous every 12 hours  furosemide   Injectable 40 milliGRAM(s) IV Push daily  latanoprost 0.005% Ophthalmic Solution 1 Drop(s) Both EYES at bedtime  losartan 100 milliGRAM(s) Oral daily    PRN MEDICATIONS  acetaminophen     Tablet .. 650 milliGRAM(s) Oral every 6 hours PRN  melatonin 3 milliGRAM(s) Oral at bedtime PRN                                            ------------------------------------------------------------  VITAL SIGNS: Last 24 Hours  T(C): 36 (06 Jan 2023 03:01), Max: 36.5 (05 Jan 2023 16:40)  T(F): 96.8 (06 Jan 2023 03:01), Max: 97.7 (05 Jan 2023 16:40)  HR: 83 (06 Jan 2023 04:00) (70 - 133)  BP: 156/90 (06 Jan 2023 04:00) (119/83 - 156/90)  BP(mean): 100 (05 Jan 2023 23:00) (96 - 109)  RR: 16 (06 Jan 2023 04:00) (13 - 18)  SpO2: 95% (06 Jan 2023 04:00) (92% - 99%)      01-04-23 @ 07:01  -  01-05-23 @ 07:00  --------------------------------------------------------  IN: 80 mL / OUT: 600 mL / NET: -520 mL    01-05-23 @ 07:01  -  01-06-23 @ 06:53  --------------------------------------------------------  IN: 270.2 mL / OUT: 1050 mL / NET: -779.8 mL                                             --------------------------------------------------------------  LABS:                        13.5   6.40  )-----------( 235      ( 05 Jan 2023 06:30 )             37.8     01-05    141  |  105  |  13  ----------------------------<  141<H>  3.9   |  20  |  0.7    Ca    9.4      05 Jan 2023 06:30  Mg     1.8     01-05    TPro  6.9  /  Alb  4.4  /  TBili  1.4<H>  /  DBili  0.3  /  AST  22  /  ALT  28  /  AlkPhos  105  01-05                  CARDIAC MARKERS ( 04 Jan 2023 20:38 )  x     / <0.01 ng/mL / x     / x     / x                                                  -------------------------------------------------------------  RADIOLOGY:                                            --------------------------------------------------------------    PHYSICAL EXAM:                                             --------------------------------------------------------------                 PUMA SMALLS 77y Female  MRN#: [id number]     Hospital Day: 2d    CC:  NEW ONSET ATRIAL FIBRILLATION; SHORTNESS OF BREATH; HYPOKALEMIA    HOSPITAL COURSE:   77F. PMH: non-smoking, HTN, DLD, and glaucoma presented 1/4 for SOB x1, 1 week prior she had the flu vaccine and Covid booster at the same time, after that she started having SOB both at rest and on exertion. She denies any prior cardiac or pulm history.     In the ED she was found to have new onset Afib with RVR, s/p Cardizem pushes and started on cardizem drip.  Vitals otherwise stable  CXR on wet read showed bilateral pleural effusion  EKG: Afib with RVR with non specific ST and T wave abnormalities   Labs showed: K2.9, Bilirubin 1.4, , Pro-, trops negative  S/p Cardioversion w/ EP  S/p amiodarone infusion and transitioned to PO. S/p IV diuresis.         SUBJECTIVE     Overnight events  None    Subjective complaints   Pt evaluated at bedside. No active complaints.                                            ----------------------------------------------------------  OBJECTIVE  PAST MEDICAL & SURGICAL HISTORY  No pertinent past medical history    No significant past surgical history                                              -----------------------------------------------------------  ALLERGIES:  No Known Allergies                                            ------------------------------------------------------------    HOME MEDICATIONS  Home Medications:  amLODIPine 5 mg oral tablet: 1 tab(s) orally once a day (05 Jan 2023 02:05)  atorvastatin 20 mg oral tablet: 1 tab(s) orally once a day (05 Jan 2023 02:31)  losartan-hydroCHLOROthiazide 100 mg-25 mg oral tablet: 1 tab(s) orally once a day (05 Jan 2023 02:05)  Lumigan 0.01% ophthalmic solution: 1 drop(s) to each affected eye once a day (in the evening) (05 Jan 2023 02:04)                           MEDICATIONS:  STANDING MEDICATIONS  aMIOdarone Infusion 1 mG/Min IV Continuous <Continuous>  aMIOdarone Infusion 0.5 mG/Min IV Continuous <Continuous>  atorvastatin 20 milliGRAM(s) Oral at bedtime  diltiazem Infusion 15 mG/Hr IV Continuous <Continuous>  enoxaparin Injectable 80 milliGRAM(s) SubCutaneous every 12 hours  furosemide   Injectable 40 milliGRAM(s) IV Push daily  latanoprost 0.005% Ophthalmic Solution 1 Drop(s) Both EYES at bedtime  losartan 100 milliGRAM(s) Oral daily    PRN MEDICATIONS  acetaminophen     Tablet .. 650 milliGRAM(s) Oral every 6 hours PRN  melatonin 3 milliGRAM(s) Oral at bedtime PRN                                            ------------------------------------------------------------  VITAL SIGNS: Last 24 Hours  T(C): 36 (06 Jan 2023 03:01), Max: 36.5 (05 Jan 2023 16:40)  T(F): 96.8 (06 Jan 2023 03:01), Max: 97.7 (05 Jan 2023 16:40)  HR: 83 (06 Jan 2023 04:00) (70 - 133)  BP: 156/90 (06 Jan 2023 04:00) (119/83 - 156/90)  BP(mean): 100 (05 Jan 2023 23:00) (96 - 109)  RR: 16 (06 Jan 2023 04:00) (13 - 18)  SpO2: 95% (06 Jan 2023 04:00) (92% - 99%)      01-04-23 @ 07:01  -  01-05-23 @ 07:00  --------------------------------------------------------  IN: 80 mL / OUT: 600 mL / NET: -520 mL    01-05-23 @ 07:01  -  01-06-23 @ 06:53  --------------------------------------------------------  IN: 270.2 mL / OUT: 1050 mL / NET: -779.8 mL                                             --------------------------------------------------------------  LABS:                        13.5   6.40  )-----------( 235      ( 05 Jan 2023 06:30 )             37.8     01-05    141  |  105  |  13  ----------------------------<  141<H>  3.9   |  20  |  0.7    Ca    9.4      05 Jan 2023 06:30  Mg     1.8     01-05    TPro  6.9  /  Alb  4.4  /  TBili  1.4<H>  /  DBili  0.3  /  AST  22  /  ALT  28  /  AlkPhos  105  01-05                  CARDIAC MARKERS ( 04 Jan 2023 20:38 )  x     / <0.01 ng/mL / x     / x     / x                                                  -------------------------------------------------------------  RADIOLOGY:                                            --------------------------------------------------------------    PHYSICAL EXAM:  GEN: NAD  NEURO: Alert & Oriented, no gross focal deficit  HEENT: nontraumatic, normocephalic, neck supple  CARD: S1, S2 audible, no S3, regular rate and rhythm, no murmur  PULM: B/L breath sounds decreased at bases, no wheezing, crackles, or rales  ABD: Soft, nondistended, nontender  EXTR: No clubbing, cyanosis, edema. 2+ pulses b/l LE  SKIN: Warm and intact                                             --------------------------------------------------------------

## 2023-01-06 NOTE — DISCHARGE NOTE NURSING/CASE MANAGEMENT/SOCIAL WORK - NSDCPEFALRISK_GEN_ALL_CORE
For information on Fall & Injury Prevention, visit: [email or web address] OR  [email or web address] OR  https://www.cdc.gov/steadi/patient.html

## 2023-01-06 NOTE — PROGRESS NOTE ADULT - ASSESSMENT
This is a 77-year-old female patient with history of HTN, DL, and glaucoma presenting for 1 week history of shortness of breath, Last week she had the flu vaccine and Covid booster at the same time, after that she started having SOB both at rest and on exertion. In the ED she was found to have new onset Afib with RVR.     A-Fib  HTN / DL This is a 77-year-old female patient with history of HTN, DL, and glaucoma presenting for 1 week history of shortness of breath, Last week she had the flu vaccine and Covid booster at the same time, after that she started having SOB both at rest and on exertion. In the ED she was found to have new onset Afib with RVR.     PAF  Acute HFpEF  HTN / DL            PLAN:    ·	S/P DCCV on 1/5. In NSR now.   ·	On Amiodarone drip. Complete the infusion, then start her on Amiodarone 200 mg po q 12h for one week, then 200 mg po daily  ·	Can d/c Lovenox and switch her to Eliquis 5 mg po q 12h  ·	ECHO reviewed. EF is 50-55%. Moderate MR and moderate AR  ·	Start her on Lasix 40 mg ivp q 12h  ·	Check i's and o's and daily wt  ·	Low salt diet and water restriction to 1.5 L/C  ·	Cont Losartan 100 mg po daily  ·	Labs reviewed. Mg is 1.8. Give Magnesium sulfate 2 mg IVPB x 1 dose  ·	Monitor electrolytes and renal function daily    Progress Note Handoff    Pending (specify):  Consults_________, Tests________, Test Results_______, Other_Fluid overload________  Family discussion:  Disposition: Home___/SNF___/Other________/Unknown at this time________    Drashan Ash MD  Spectra: [phone]

## 2023-01-07 VITALS
SYSTOLIC BLOOD PRESSURE: 129 MMHG | RESPIRATION RATE: 18 BRPM | TEMPERATURE: 98 F | DIASTOLIC BLOOD PRESSURE: 74 MMHG | HEART RATE: 88 BPM

## 2023-01-07 LAB
ALBUMIN SERPL ELPH-MCNC: 4.4 G/DL — SIGNIFICANT CHANGE UP (ref 3.5–5.2)
ALP SERPL-CCNC: 110 U/L — SIGNIFICANT CHANGE UP (ref 30–115)
ALT FLD-CCNC: 21 U/L — SIGNIFICANT CHANGE UP (ref 0–41)
ANION GAP SERPL CALC-SCNC: 11 MMOL/L — SIGNIFICANT CHANGE UP (ref 7–14)
AST SERPL-CCNC: 20 U/L — SIGNIFICANT CHANGE UP (ref 0–41)
BASOPHILS # BLD AUTO: 0.04 K/UL — SIGNIFICANT CHANGE UP (ref 0–0.2)
BASOPHILS NFR BLD AUTO: 0.4 % — SIGNIFICANT CHANGE UP (ref 0–1)
BILIRUB SERPL-MCNC: 1.9 MG/DL — HIGH (ref 0.2–1.2)
BUN SERPL-MCNC: 17 MG/DL — SIGNIFICANT CHANGE UP (ref 10–20)
CALCIUM SERPL-MCNC: 9.8 MG/DL — SIGNIFICANT CHANGE UP (ref 8.4–10.5)
CHLORIDE SERPL-SCNC: 98 MMOL/L — SIGNIFICANT CHANGE UP (ref 98–110)
CO2 SERPL-SCNC: 31 MMOL/L — SIGNIFICANT CHANGE UP (ref 17–32)
CREAT SERPL-MCNC: 0.9 MG/DL — SIGNIFICANT CHANGE UP (ref 0.7–1.5)
EGFR: 67 ML/MIN/1.73M2 — SIGNIFICANT CHANGE UP
EOSINOPHIL # BLD AUTO: 0.18 K/UL — SIGNIFICANT CHANGE UP (ref 0–0.7)
EOSINOPHIL NFR BLD AUTO: 2 % — SIGNIFICANT CHANGE UP (ref 0–8)
GLUCOSE SERPL-MCNC: 120 MG/DL — HIGH (ref 70–99)
HCT VFR BLD CALC: 42.2 % — SIGNIFICANT CHANGE UP (ref 37–47)
HGB BLD-MCNC: 14.7 G/DL — SIGNIFICANT CHANGE UP (ref 12–16)
IMM GRANULOCYTES NFR BLD AUTO: 0.3 % — SIGNIFICANT CHANGE UP (ref 0.1–0.3)
LYMPHOCYTES # BLD AUTO: 1.36 K/UL — SIGNIFICANT CHANGE UP (ref 1.2–3.4)
LYMPHOCYTES # BLD AUTO: 15.3 % — LOW (ref 20.5–51.1)
MAGNESIUM SERPL-MCNC: 2.1 MG/DL — SIGNIFICANT CHANGE UP (ref 1.8–2.4)
MCHC RBC-ENTMCNC: 29.2 PG — SIGNIFICANT CHANGE UP (ref 27–31)
MCHC RBC-ENTMCNC: 34.8 G/DL — SIGNIFICANT CHANGE UP (ref 32–37)
MCV RBC AUTO: 83.7 FL — SIGNIFICANT CHANGE UP (ref 81–99)
MONOCYTES # BLD AUTO: 0.94 K/UL — HIGH (ref 0.1–0.6)
MONOCYTES NFR BLD AUTO: 10.6 % — HIGH (ref 1.7–9.3)
NEUTROPHILS # BLD AUTO: 6.34 K/UL — SIGNIFICANT CHANGE UP (ref 1.4–6.5)
NEUTROPHILS NFR BLD AUTO: 71.4 % — SIGNIFICANT CHANGE UP (ref 42.2–75.2)
NRBC # BLD: 0 /100 WBCS — SIGNIFICANT CHANGE UP (ref 0–0)
PLATELET # BLD AUTO: 252 K/UL — SIGNIFICANT CHANGE UP (ref 130–400)
POTASSIUM SERPL-MCNC: 2.9 MMOL/L — LOW (ref 3.5–5)
POTASSIUM SERPL-SCNC: 2.9 MMOL/L — LOW (ref 3.5–5)
PROT SERPL-MCNC: 7.2 G/DL — SIGNIFICANT CHANGE UP (ref 6–8)
RBC # BLD: 5.04 M/UL — SIGNIFICANT CHANGE UP (ref 4.2–5.4)
RBC # FLD: 13.6 % — SIGNIFICANT CHANGE UP (ref 11.5–14.5)
SODIUM SERPL-SCNC: 140 MMOL/L — SIGNIFICANT CHANGE UP (ref 135–146)
WBC # BLD: 8.89 K/UL — SIGNIFICANT CHANGE UP (ref 4.8–10.8)
WBC # FLD AUTO: 8.89 K/UL — SIGNIFICANT CHANGE UP (ref 4.8–10.8)

## 2023-01-07 PROCEDURE — 99239 HOSP IP/OBS DSCHRG MGMT >30: CPT

## 2023-01-07 RX ORDER — AMIODARONE HYDROCHLORIDE 400 MG/1
1 TABLET ORAL
Qty: 60 | Refills: 0
Start: 2023-01-07 | End: 2023-02-05

## 2023-01-07 RX ORDER — CEPHALEXIN 500 MG
1 CAPSULE ORAL
Qty: 14 | Refills: 0
Start: 2023-01-07 | End: 2023-01-13

## 2023-01-07 RX ORDER — POTASSIUM CHLORIDE 20 MEQ
40 PACKET (EA) ORAL
Refills: 0 | Status: COMPLETED | OUTPATIENT
Start: 2023-01-07 | End: 2023-01-07

## 2023-01-07 RX ORDER — CEPHALEXIN 500 MG
500 CAPSULE ORAL ONCE
Refills: 0 | Status: COMPLETED | OUTPATIENT
Start: 2023-01-07 | End: 2023-01-07

## 2023-01-07 RX ORDER — FUROSEMIDE 40 MG
40 TABLET ORAL DAILY
Refills: 0 | Status: DISCONTINUED | OUTPATIENT
Start: 2023-01-07 | End: 2023-01-07

## 2023-01-07 RX ORDER — POTASSIUM CHLORIDE 20 MEQ
20 PACKET (EA) ORAL ONCE
Refills: 0 | Status: COMPLETED | OUTPATIENT
Start: 2023-01-07 | End: 2023-01-07

## 2023-01-07 RX ADMIN — Medication 40 MILLIGRAM(S): at 06:21

## 2023-01-07 RX ADMIN — APIXABAN 5 MILLIGRAM(S): 2.5 TABLET, FILM COATED ORAL at 11:19

## 2023-01-07 RX ADMIN — AMIODARONE HYDROCHLORIDE 200 MILLIGRAM(S): 400 TABLET ORAL at 18:50

## 2023-01-07 RX ADMIN — Medication 40 MILLIGRAM(S): at 16:31

## 2023-01-07 RX ADMIN — Medication 500 MILLIGRAM(S): at 16:31

## 2023-01-07 RX ADMIN — Medication 40 MILLIEQUIVALENT(S): at 11:18

## 2023-01-07 RX ADMIN — AMIODARONE HYDROCHLORIDE 200 MILLIGRAM(S): 400 TABLET ORAL at 06:21

## 2023-01-07 RX ADMIN — Medication 50 MILLIEQUIVALENT(S): at 11:20

## 2023-01-07 RX ADMIN — LOSARTAN POTASSIUM 100 MILLIGRAM(S): 100 TABLET, FILM COATED ORAL at 06:20

## 2023-01-07 NOTE — PROGRESS NOTE ADULT - SUBJECTIVE AND OBJECTIVE BOX
PUMA SMALLS  77y Female    CHIEF COMPLAINT:    Patient is a 77y old  Female who presents with a chief complaint of SOB (2023 19:17)      INTERVAL HPI/OVERNIGHT EVENTS:    Patient seen and examined. Feels better. No palpitations. No sob.    ROS: All other systems are negative.    Vital Signs:    T(F): 98.5 (23 @ 04:56), Max: 98.8 (23 @ 14:31)  HR: 84 (23 @ 04:56) (77 - 89)  BP: 150/85 (23 @ 04:56) (128/81 - 150/85)  RR: 18 (23 @ 04:56) (18 - 18)  SpO2: --  I&O's Summary    2023 07:01  -  2023 07:00  --------------------------------------------------------  IN: 256.9 mL / OUT: 2400 mL / NET: -2143.1 mL      Daily     Daily Weight in k.8 (2023 04:56)  CAPILLARY BLOOD GLUCOSE          PHYSICAL EXAM:    GENERAL:  NAD  SKIN: No rashes or lesions  HENT: Atraumatic. Normocephalic. PERRL. Moist membranes.  NECK: Supple, No JVD. No lymphadenopathy.  PULMONARY: CTA B/L. No wheezing. No rales  CVS: Normal S1, S2. Rate and Rhythm are regular. No murmurs.  ABDOMEN/GI: Soft, Nontender, Nondistended; BS present  EXTREMITIES: Peripheral pulses intact. No edema B/L LE.  NEUROLOGIC:  No motor or sensory deficit.  PSYCH: Alert & oriented x 3    Consultant(s) Notes Reviewed:  [x ] YES  [ ] NO  Care Discussed with Consultants/Other Providers [ x] YES  [ ] NO    EKG reviewed  Telemetry reviewed    LABS:                        14.7   8.89  )-----------( 252      ( 2023 07:18 )             42.2     -    140  |  98  |  17  ----------------------------<  120<H>  2.9<L>   |  31  |  0.9    Ca    9.8      2023 07:18  Mg     2.1         TPro  7.2  /  Alb  4.4  /  TBili  1.9<H>  /  DBili  x   /  AST  20  /  ALT  21  /  AlkPhos  110        Serum Pro-Brain Natriuretic Peptide: 952 pg/mL (23 @ 20:38)    Trop <0.01, CKMB --, CK --, 23 @ 20:38        RADIOLOGY & ADDITIONAL TESTS:      Imaging or report Personally Reviewed:  [ ] YES  [ ] NO    Medications:  Standing  aMIOdarone    Tablet 200 milliGRAM(s) Oral every 12 hours  apixaban 5 milliGRAM(s) Oral every 12 hours  atorvastatin 20 milliGRAM(s) Oral at bedtime  furosemide    Tablet 40 milliGRAM(s) Oral daily  latanoprost 0.005% Ophthalmic Solution 1 Drop(s) Both EYES at bedtime  losartan 100 milliGRAM(s) Oral daily  potassium chloride   Powder 40 milliEquivalent(s) Oral two times a day  potassium chloride  20 mEq/100 mL IVPB 20 milliEquivalent(s) IV Intermittent once    PRN Meds  acetaminophen     Tablet .. 650 milliGRAM(s) Oral every 6 hours PRN  melatonin 3 milliGRAM(s) Oral at bedtime PRN      Case discussed with resident    Care discussed with pt/family

## 2023-01-07 NOTE — PROGRESS NOTE ADULT - ASSESSMENT
This is a 77-year-old female patient with history of HTN, DL, and glaucoma presenting for 1 week history of shortness of breath, Last week she had the flu vaccine and Covid booster at the same time, after that she started having SOB both at rest and on exertion. In the ED she was found to have new onset Afib with RVR.     PAF  Acute HFpEF  HTN / DL            PLAN:    ·	S/P DCCV on 1/5. In NSR now.   ·	Cont Amiodarone 200 mg po q 12h for one week, then 200 mg po daily  ·	Cont Eliquis 5 mg po q 12h  ·	ECHO reviewed. EF is 50-55%. Moderate MR and moderate AR  ·	Euvolemic. Switch her to Lasix 40 mg po daily for 3 more days.   ·	Low salt diet and water restriction to 1.5 L/C  ·	Cont Losartan 100 mg po daily  ·	Labs reviewed. Replete K  ·	D/C home    * Med rec reviewed. Plan of care d/w the pt. Time spent 37 minutes.

## 2023-01-08 RX ORDER — FUROSEMIDE 40 MG
1 TABLET ORAL
Qty: 3 | Refills: 0
Start: 2023-01-08 | End: 2023-01-10

## 2023-01-17 DIAGNOSIS — R74.01 ELEVATION OF LEVELS OF LIVER TRANSAMINASE LEVELS: ICD-10-CM

## 2023-01-17 DIAGNOSIS — I48.0 PAROXYSMAL ATRIAL FIBRILLATION: ICD-10-CM

## 2023-01-17 DIAGNOSIS — E80.6 OTHER DISORDERS OF BILIRUBIN METABOLISM: ICD-10-CM

## 2023-01-17 DIAGNOSIS — I11.0 HYPERTENSIVE HEART DISEASE WITH HEART FAILURE: ICD-10-CM

## 2023-01-17 DIAGNOSIS — Z79.811 LONG TERM (CURRENT) USE OF AROMATASE INHIBITORS: ICD-10-CM

## 2023-01-17 DIAGNOSIS — R06.02 SHORTNESS OF BREATH: ICD-10-CM

## 2023-01-17 DIAGNOSIS — H40.9 UNSPECIFIED GLAUCOMA: ICD-10-CM

## 2023-01-17 DIAGNOSIS — I50.31 ACUTE DIASTOLIC (CONGESTIVE) HEART FAILURE: ICD-10-CM

## 2023-01-17 DIAGNOSIS — E87.6 HYPOKALEMIA: ICD-10-CM

## 2023-06-19 PROBLEM — Z00.00 ENCOUNTER FOR PREVENTIVE HEALTH EXAMINATION: Status: ACTIVE | Noted: 2023-06-19

## 2023-07-17 ENCOUNTER — RESULT REVIEW (OUTPATIENT)
Age: 78
End: 2023-07-17

## 2023-07-17 ENCOUNTER — OUTPATIENT (OUTPATIENT)
Dept: OUTPATIENT SERVICES | Facility: HOSPITAL | Age: 78
LOS: 1 days | End: 2023-07-17
Payer: COMMERCIAL

## 2023-07-17 VITALS
DIASTOLIC BLOOD PRESSURE: 76 MMHG | HEIGHT: 64 IN | SYSTOLIC BLOOD PRESSURE: 161 MMHG | HEART RATE: 57 BPM | WEIGHT: 165.35 LBS | RESPIRATION RATE: 16 BRPM | OXYGEN SATURATION: 96 % | TEMPERATURE: 96 F

## 2023-07-17 DIAGNOSIS — Z98.83 FILTERING (VITREOUS) BLEB AFTER GLAUCOMA SURGERY STATUS: Chronic | ICD-10-CM

## 2023-07-17 DIAGNOSIS — Z01.818 ENCOUNTER FOR OTHER PREPROCEDURAL EXAMINATION: ICD-10-CM

## 2023-07-17 DIAGNOSIS — I48.19 OTHER PERSISTENT ATRIAL FIBRILLATION: ICD-10-CM

## 2023-07-17 DIAGNOSIS — Z98.890 OTHER SPECIFIED POSTPROCEDURAL STATES: Chronic | ICD-10-CM

## 2023-07-17 LAB
ALBUMIN SERPL ELPH-MCNC: 5 G/DL — SIGNIFICANT CHANGE UP (ref 3.5–5.2)
ALP SERPL-CCNC: 81 U/L — SIGNIFICANT CHANGE UP (ref 30–115)
ALT FLD-CCNC: 21 U/L — SIGNIFICANT CHANGE UP (ref 0–41)
ANION GAP SERPL CALC-SCNC: 15 MMOL/L — HIGH (ref 7–14)
AST SERPL-CCNC: 29 U/L — SIGNIFICANT CHANGE UP (ref 0–41)
BASOPHILS # BLD AUTO: 0.05 K/UL — SIGNIFICANT CHANGE UP (ref 0–0.2)
BASOPHILS NFR BLD AUTO: 0.8 % — SIGNIFICANT CHANGE UP (ref 0–1)
BILIRUB SERPL-MCNC: 1.7 MG/DL — HIGH (ref 0.2–1.2)
BUN SERPL-MCNC: 20 MG/DL — SIGNIFICANT CHANGE UP (ref 10–20)
CALCIUM SERPL-MCNC: 10.4 MG/DL — SIGNIFICANT CHANGE UP (ref 8.4–10.5)
CHLORIDE SERPL-SCNC: 98 MMOL/L — SIGNIFICANT CHANGE UP (ref 98–110)
CO2 SERPL-SCNC: 28 MMOL/L — SIGNIFICANT CHANGE UP (ref 17–32)
CREAT SERPL-MCNC: 1.2 MG/DL — SIGNIFICANT CHANGE UP (ref 0.7–1.5)
EGFR: 47 ML/MIN/1.73M2 — LOW
EOSINOPHIL # BLD AUTO: 0.09 K/UL — SIGNIFICANT CHANGE UP (ref 0–0.7)
EOSINOPHIL NFR BLD AUTO: 1.4 % — SIGNIFICANT CHANGE UP (ref 0–8)
GLUCOSE SERPL-MCNC: 81 MG/DL — SIGNIFICANT CHANGE UP (ref 70–99)
HCT VFR BLD CALC: 43.1 % — SIGNIFICANT CHANGE UP (ref 37–47)
HGB BLD-MCNC: 15.2 G/DL — SIGNIFICANT CHANGE UP (ref 12–16)
IMM GRANULOCYTES NFR BLD AUTO: 0.3 % — SIGNIFICANT CHANGE UP (ref 0.1–0.3)
INR BLD: 1.63 RATIO — HIGH (ref 0.65–1.3)
LYMPHOCYTES # BLD AUTO: 1.39 K/UL — SIGNIFICANT CHANGE UP (ref 1.2–3.4)
LYMPHOCYTES # BLD AUTO: 22.3 % — SIGNIFICANT CHANGE UP (ref 20.5–51.1)
MCHC RBC-ENTMCNC: 30 PG — SIGNIFICANT CHANGE UP (ref 27–31)
MCHC RBC-ENTMCNC: 35.3 G/DL — SIGNIFICANT CHANGE UP (ref 32–37)
MCV RBC AUTO: 85 FL — SIGNIFICANT CHANGE UP (ref 81–99)
MONOCYTES # BLD AUTO: 0.64 K/UL — HIGH (ref 0.1–0.6)
MONOCYTES NFR BLD AUTO: 10.3 % — HIGH (ref 1.7–9.3)
NEUTROPHILS # BLD AUTO: 4.03 K/UL — SIGNIFICANT CHANGE UP (ref 1.4–6.5)
NEUTROPHILS NFR BLD AUTO: 64.9 % — SIGNIFICANT CHANGE UP (ref 42.2–75.2)
NRBC # BLD: 0 /100 WBCS — SIGNIFICANT CHANGE UP (ref 0–0)
PLATELET # BLD AUTO: 276 K/UL — SIGNIFICANT CHANGE UP (ref 130–400)
PMV BLD: 11.9 FL — HIGH (ref 7.4–10.4)
POTASSIUM SERPL-MCNC: 3.1 MMOL/L — LOW (ref 3.5–5)
POTASSIUM SERPL-SCNC: 3.1 MMOL/L — LOW (ref 3.5–5)
PROT SERPL-MCNC: 7.6 G/DL — SIGNIFICANT CHANGE UP (ref 6–8)
PROTHROM AB SERPL-ACNC: 18.9 SEC — HIGH (ref 9.95–12.87)
RBC # BLD: 5.07 M/UL — SIGNIFICANT CHANGE UP (ref 4.2–5.4)
RBC # FLD: 13 % — SIGNIFICANT CHANGE UP (ref 11.5–14.5)
SODIUM SERPL-SCNC: 141 MMOL/L — SIGNIFICANT CHANGE UP (ref 135–146)
WBC # BLD: 6.22 K/UL — SIGNIFICANT CHANGE UP (ref 4.8–10.8)
WBC # FLD AUTO: 6.22 K/UL — SIGNIFICANT CHANGE UP (ref 4.8–10.8)

## 2023-07-17 PROCEDURE — 86901 BLOOD TYPING SEROLOGIC RH(D): CPT

## 2023-07-17 PROCEDURE — 85610 PROTHROMBIN TIME: CPT

## 2023-07-17 PROCEDURE — 85025 COMPLETE CBC W/AUTO DIFF WBC: CPT

## 2023-07-17 PROCEDURE — 99214 OFFICE O/P EST MOD 30 MIN: CPT | Mod: 25

## 2023-07-17 PROCEDURE — 93005 ELECTROCARDIOGRAM TRACING: CPT

## 2023-07-17 PROCEDURE — 71046 X-RAY EXAM CHEST 2 VIEWS: CPT

## 2023-07-17 PROCEDURE — 86850 RBC ANTIBODY SCREEN: CPT

## 2023-07-17 PROCEDURE — 86900 BLOOD TYPING SEROLOGIC ABO: CPT

## 2023-07-17 PROCEDURE — 80053 COMPREHEN METABOLIC PANEL: CPT

## 2023-07-17 PROCEDURE — 93010 ELECTROCARDIOGRAM REPORT: CPT

## 2023-07-17 PROCEDURE — 36415 COLL VENOUS BLD VENIPUNCTURE: CPT

## 2023-07-17 PROCEDURE — 71046 X-RAY EXAM CHEST 2 VIEWS: CPT | Mod: 26

## 2023-07-17 NOTE — H&P PST ADULT - NSICDXFAMILYHX_GEN_ALL_CORE_FT
FAMILY HISTORY:  Father  Still living? No  Family history of throat cancer, Age at diagnosis: Age Unknown    Mother  Still living? No  Family history of early CAD, Age at diagnosis: Age Unknown

## 2023-07-17 NOTE — H&P PST ADULT - NSICDXPASTMEDICALHX_GEN_ALL_CORE_FT
PAST MEDICAL HISTORY:  Atrial fibrillation and flutter     Glaucoma     HTN (hypertension)     Hyperlipidemia

## 2023-07-17 NOTE — H&P PST ADULT - REASON FOR ADMISSION
78 yr old female went to ED C/O SOB, found to be in afib with RVR. PT was cardioverted. PT placed on Eliquis. PT put on Amiodarone and now electing to have ablation  with Dr. Diaz 07/31/23.

## 2023-07-17 NOTE — H&P PST ADULT - HISTORY OF PRESENT ILLNESS
78 yr old female went to ED C/O SOB, found to be in afib with RVR. PT was cardioverted. PT placed on Eliquis. PT put on Amiodarone and now electing to have ablation. Pt PMHX of HTN, hyperlipidemia, Glaucoma.  PATIENT CURRENTLY DENIES CHEST PAIN  SHORTNESS OF BREATH  PALPITATIONS,  DYSURIA, OR UPPER RESPIRATORY INFECTION IN PAST 2 WEEKS  EXERCISE  TOLERANCE  1-2 FLIGHT OF STAIRS  WITHOUT SHORTNESS OF BREATH  denies travel outside the [place] in the past 30 days  Patient denies any signs or symptoms of COVID 19 and denies contact with known positive individuals.  They have an appointment for repeat COVID testing pre-procedure and acknowledge its time and place.  They were instructed to quarantine pre-procedure, practice exposure control measures, continue to self-monitor and report any concerns to their proceduralist.  pt advised self quarantine till day of procedure  Anesthesia Alert  NO--Difficult Airway  NO--History of neck surgery or radiation  NO--Limited ROM of neck  NO--History of Malignant hyperthermia  NO--No personal or family history of Pseudocholinesterase deficiency.  NO--Prior Anesthesia Complication  NO--Latex Allergy  NO--Loose teeth  NO--History of Rheumatoid Arthritis  NO--Bleeding risk  NO--ZHAO  NO--Other_____    PT DENIES ANY RASHES, ABRASION, OR OPEN WOUNDS OR CUTS    AS PER THE PT, THIS IS HIS/HER COMPLETE MEDICAL AND SURGICAL HX, INCLUDING MEDICATIONS PRESCRIBED AND OVER THE COUNTER    Patient verbalized understanding of instructions and was given the opportunity to ask questions and have them answered.    pt denies any suicidal ideation or thoughts, pt states not a threat to self or others

## 2023-07-18 DIAGNOSIS — I48.19 OTHER PERSISTENT ATRIAL FIBRILLATION: ICD-10-CM

## 2023-07-18 DIAGNOSIS — Z01.818 ENCOUNTER FOR OTHER PREPROCEDURAL EXAMINATION: ICD-10-CM

## 2023-07-21 ENCOUNTER — OUTPATIENT (OUTPATIENT)
Dept: OUTPATIENT SERVICES | Facility: HOSPITAL | Age: 78
LOS: 1 days | End: 2023-07-21
Payer: COMMERCIAL

## 2023-07-21 DIAGNOSIS — Z98.83 FILTERING (VITREOUS) BLEB AFTER GLAUCOMA SURGERY STATUS: Chronic | ICD-10-CM

## 2023-07-21 DIAGNOSIS — Z02.9 ENCOUNTER FOR ADMINISTRATIVE EXAMINATIONS, UNSPECIFIED: ICD-10-CM

## 2023-07-21 DIAGNOSIS — Z98.890 OTHER SPECIFIED POSTPROCEDURAL STATES: Chronic | ICD-10-CM

## 2023-07-21 PROBLEM — H40.9 UNSPECIFIED GLAUCOMA: Chronic | Status: ACTIVE | Noted: 2023-07-17

## 2023-07-21 PROBLEM — I48.92 UNSPECIFIED ATRIAL FLUTTER: Chronic | Status: ACTIVE | Noted: 2023-07-17

## 2023-07-21 PROBLEM — I10 ESSENTIAL (PRIMARY) HYPERTENSION: Chronic | Status: ACTIVE | Noted: 2023-07-17

## 2023-07-21 PROBLEM — E78.5 HYPERLIPIDEMIA, UNSPECIFIED: Chronic | Status: ACTIVE | Noted: 2023-07-17

## 2023-07-21 LAB
POTASSIUM SERPL-MCNC: 3.5 MMOL/L — SIGNIFICANT CHANGE UP (ref 3.5–5)
POTASSIUM SERPL-SCNC: 3.5 MMOL/L — SIGNIFICANT CHANGE UP (ref 3.5–5)

## 2023-07-21 PROCEDURE — 36415 COLL VENOUS BLD VENIPUNCTURE: CPT

## 2023-07-21 PROCEDURE — 84132 ASSAY OF SERUM POTASSIUM: CPT

## 2023-07-22 DIAGNOSIS — Z02.9 ENCOUNTER FOR ADMINISTRATIVE EXAMINATIONS, UNSPECIFIED: ICD-10-CM

## 2023-07-31 ENCOUNTER — TRANSCRIPTION ENCOUNTER (OUTPATIENT)
Age: 78
End: 2023-07-31

## 2023-07-31 ENCOUNTER — INPATIENT (INPATIENT)
Facility: HOSPITAL | Age: 78
LOS: 0 days | Discharge: HOME | DRG: 274 | End: 2023-08-01
Attending: STUDENT IN AN ORGANIZED HEALTH CARE EDUCATION/TRAINING PROGRAM | Admitting: STUDENT IN AN ORGANIZED HEALTH CARE EDUCATION/TRAINING PROGRAM
Payer: COMMERCIAL

## 2023-07-31 ENCOUNTER — APPOINTMENT (OUTPATIENT)
Dept: ELECTROPHYSIOLOGY | Facility: HOSPITAL | Age: 78
End: 2023-07-31

## 2023-07-31 VITALS — HEIGHT: 63.78 IN | WEIGHT: 165.35 LBS

## 2023-07-31 DIAGNOSIS — I48.19 OTHER PERSISTENT ATRIAL FIBRILLATION: ICD-10-CM

## 2023-07-31 DIAGNOSIS — Z98.890 OTHER SPECIFIED POSTPROCEDURAL STATES: Chronic | ICD-10-CM

## 2023-07-31 DIAGNOSIS — Z98.83 FILTERING (VITREOUS) BLEB AFTER GLAUCOMA SURGERY STATUS: Chronic | ICD-10-CM

## 2023-07-31 DIAGNOSIS — I48.91 UNSPECIFIED ATRIAL FIBRILLATION: ICD-10-CM

## 2023-07-31 LAB — BLD GP AB SCN SERPL QL: SIGNIFICANT CHANGE UP

## 2023-07-31 PROCEDURE — 93005 ELECTROCARDIOGRAM TRACING: CPT

## 2023-07-31 PROCEDURE — C9113: CPT

## 2023-07-31 PROCEDURE — 93655 ICAR CATH ABLTJ DSCRT ARRHYT: CPT

## 2023-07-31 PROCEDURE — 93312 ECHO TRANSESOPHAGEAL: CPT | Mod: 26

## 2023-07-31 PROCEDURE — 93320 DOPPLER ECHO COMPLETE: CPT | Mod: 26

## 2023-07-31 PROCEDURE — 93325 DOPPLER ECHO COLOR FLOW MAPG: CPT | Mod: 26

## 2023-07-31 PROCEDURE — 93656 COMPRE EP EVAL ABLTJ ATR FIB: CPT

## 2023-07-31 PROCEDURE — C9399: CPT

## 2023-07-31 PROCEDURE — 93623 PRGRMD STIMJ&PACG IV RX NFS: CPT | Mod: 26

## 2023-07-31 RX ORDER — AMIODARONE HYDROCHLORIDE 400 MG/1
200 TABLET ORAL DAILY
Refills: 0 | Status: DISCONTINUED | OUTPATIENT
Start: 2023-07-31 | End: 2023-08-01

## 2023-07-31 RX ORDER — METOPROLOL TARTRATE 50 MG
50 TABLET ORAL EVERY 12 HOURS
Refills: 0 | Status: DISCONTINUED | OUTPATIENT
Start: 2023-07-31 | End: 2023-08-01

## 2023-07-31 RX ORDER — PANTOPRAZOLE SODIUM 20 MG/1
40 TABLET, DELAYED RELEASE ORAL
Refills: 0 | Status: DISCONTINUED | OUTPATIENT
Start: 2023-08-01 | End: 2023-08-01

## 2023-07-31 RX ORDER — LANOLIN ALCOHOL/MO/W.PET/CERES
3 CREAM (GRAM) TOPICAL AT BEDTIME
Refills: 0 | Status: DISCONTINUED | OUTPATIENT
Start: 2023-07-31 | End: 2023-08-01

## 2023-07-31 RX ORDER — LOSARTAN POTASSIUM 100 MG/1
100 TABLET, FILM COATED ORAL DAILY
Refills: 0 | Status: DISCONTINUED | OUTPATIENT
Start: 2023-07-31 | End: 2023-08-01

## 2023-07-31 RX ORDER — BIMATOPROST 0.3 MG/ML
1 SOLUTION/ DROPS OPHTHALMIC
Qty: 0 | Refills: 0 | DISCHARGE

## 2023-07-31 RX ORDER — ATORVASTATIN CALCIUM 80 MG/1
20 TABLET, FILM COATED ORAL AT BEDTIME
Refills: 0 | Status: DISCONTINUED | OUTPATIENT
Start: 2023-07-31 | End: 2023-08-01

## 2023-07-31 RX ORDER — SUCRALFATE 1 G
1 TABLET ORAL
Refills: 0 | Status: DISCONTINUED | OUTPATIENT
Start: 2023-07-31 | End: 2023-08-01

## 2023-07-31 RX ORDER — LATANOPROST 0.05 MG/ML
1 SOLUTION/ DROPS OPHTHALMIC; TOPICAL AT BEDTIME
Refills: 0 | Status: DISCONTINUED | OUTPATIENT
Start: 2023-07-31 | End: 2023-08-01

## 2023-07-31 RX ORDER — AMLODIPINE BESYLATE 2.5 MG/1
5 TABLET ORAL DAILY
Refills: 0 | Status: DISCONTINUED | OUTPATIENT
Start: 2023-07-31 | End: 2023-08-01

## 2023-07-31 RX ORDER — ATORVASTATIN CALCIUM 80 MG/1
1 TABLET, FILM COATED ORAL
Qty: 0 | Refills: 0 | DISCHARGE

## 2023-07-31 RX ORDER — PANTOPRAZOLE SODIUM 20 MG/1
40 TABLET, DELAYED RELEASE ORAL ONCE
Refills: 0 | Status: COMPLETED | OUTPATIENT
Start: 2023-07-31 | End: 2023-07-31

## 2023-07-31 RX ORDER — FAMOTIDINE 10 MG/ML
20 INJECTION INTRAVENOUS ONCE
Refills: 0 | Status: COMPLETED | OUTPATIENT
Start: 2023-07-31 | End: 2023-07-31

## 2023-07-31 RX ORDER — APIXABAN 2.5 MG/1
5 TABLET, FILM COATED ORAL EVERY 12 HOURS
Refills: 0 | Status: DISCONTINUED | OUTPATIENT
Start: 2023-07-31 | End: 2023-08-01

## 2023-07-31 RX ORDER — METOPROLOL TARTRATE 50 MG
1 TABLET ORAL
Refills: 0 | DISCHARGE

## 2023-07-31 RX ORDER — ACETAMINOPHEN 500 MG
650 TABLET ORAL EVERY 6 HOURS
Refills: 0 | Status: DISCONTINUED | OUTPATIENT
Start: 2023-07-31 | End: 2023-08-01

## 2023-07-31 RX ORDER — AMLODIPINE BESYLATE 2.5 MG/1
1 TABLET ORAL
Qty: 0 | Refills: 0 | DISCHARGE

## 2023-07-31 RX ORDER — LOSARTAN/HYDROCHLOROTHIAZIDE 100MG-25MG
1 TABLET ORAL
Qty: 0 | Refills: 0 | DISCHARGE

## 2023-07-31 RX ADMIN — ATORVASTATIN CALCIUM 20 MILLIGRAM(S): 80 TABLET, FILM COATED ORAL at 22:06

## 2023-07-31 RX ADMIN — APIXABAN 5 MILLIGRAM(S): 2.5 TABLET, FILM COATED ORAL at 22:06

## 2023-07-31 RX ADMIN — Medication 1 GRAM(S): at 17:05

## 2023-07-31 RX ADMIN — APIXABAN 5 MILLIGRAM(S): 2.5 TABLET, FILM COATED ORAL at 16:00

## 2023-07-31 RX ADMIN — Medication 50 MILLIGRAM(S): at 17:04

## 2023-07-31 RX ADMIN — PANTOPRAZOLE SODIUM 40 MILLIGRAM(S): 20 TABLET, DELAYED RELEASE ORAL at 11:54

## 2023-07-31 RX ADMIN — LATANOPROST 1 DROP(S): 0.05 SOLUTION/ DROPS OPHTHALMIC; TOPICAL at 22:06

## 2023-07-31 RX ADMIN — FAMOTIDINE 20 MILLIGRAM(S): 10 INJECTION INTRAVENOUS at 22:05

## 2023-07-31 NOTE — PROGRESS NOTE ADULT - SUBJECTIVE AND OBJECTIVE BOX
Electrophysiology Brief Post-Op Note      I have personally seen and examined the patient.  I agree with the history and physical which I have reviewed and noted any changes below.  07-31-23 @ 07:48    PRE-OP DIAGNOSIS: AFl AF     POST-OP DIAGNOSIS: AFL AF    PROCEDURE: ablation     Vendor Representative was present for clinical support.    Physician: Emily  Assistant: None    ANESTHESIA TYPE:  [X  ]General Anesthesia  [  ] Sedation  [X  ] Local/Regional    ESTIMATED BLOOD LOSS:  40     mL    CONDITION  [  ] Critical  [  ] Serious  [  ]Fair  [X  ]Good      SPECIMENS REMOVED (IF APPLICABLE):  none    IMPLANTS (IF APPLICABLE)  none    FINDINGS: none    COMPLICATIONS: none     PLAN OF CARE  -	Start Eliquis 5 mg q12 tonight at 10 pm  -           Start protonix 40 mg daily tonight  -	Bed rest till am  -	Admit to telemetry

## 2023-07-31 NOTE — ASU PATIENT PROFILE, ADULT - FALL HARM RISK - UNIVERSAL INTERVENTIONS
Bed in lowest position, wheels locked, appropriate side rails in place/Call bell, personal items and telephone in reach/Instruct patient to call for assistance before getting out of bed or chair/Non-slip footwear when patient is out of bed/[place] to call system/Physically safe environment - no spills, clutter or unnecessary equipment/Purposeful Proactive Rounding/Room/bathroom lighting operational, light cord in reach

## 2023-07-31 NOTE — DISCHARGE NOTE PROVIDER - NSDCMRMEDTOKEN_GEN_ALL_CORE_FT
amiodarone 200 mg oral tablet: 1 tab(s) orally every 12 hours till 1/12/2022.   Then,  1 tab(s) orally once a day  starting 1/13/2022.  amLODIPine 5 mg oral tablet: 1 tab(s) orally once a day  apixaban 5 mg oral tablet: 1 tab(s) orally every 12 hours  atorvastatin 20 mg oral tablet: 1 tab(s) orally once a day  losartan-hydroCHLOROthiazide 100 mg-25 mg oral tablet: 1 tab(s) orally once a day  Lumigan 0.01% ophthalmic solution: 1 drop(s) to each affected eye once a day (in the evening)  Metoprolol Tartrate 50 mg oral tablet: 1 orally 2 times a day   amiodarone 200 mg oral tablet: 1 tab(s) orally once a day  amLODIPine 5 mg oral tablet: 1 tab(s) orally once a day  apixaban 5 mg oral tablet: 1 tab(s) orally every 12 hours  atorvastatin 20 mg oral tablet: 1 tab(s) orally once a day  losartan-hydroCHLOROthiazide 100 mg-25 mg oral tablet: 1 tab(s) orally once a day  Lumigan 0.01% ophthalmic solution: 1 drop(s) to each affected eye once a day (in the evening)  Metoprolol Tartrate 50 mg oral tablet: 1 orally 2 times a day  pantoprazole 40 mg oral delayed release tablet: 1 tab(s) orally once a day (before a meal)  sucralfate 1 g oral tablet: 1 tab(s) orally 2 times a day

## 2023-07-31 NOTE — DISCHARGE NOTE PROVIDER - ATTENDING DISCHARGE PHYSICAL EXAMINATION:
I saw and evaluated the patient the day of discharge.  They are s/p elective AF cryoablation with Dr. Diaz.  On the day of discharge they are hemodynamically stable, comfortable appearing, and without complaints.  All questions and concerns were addressed.  Patient is stable for discharge and close follow up with EP and cardiology.

## 2023-07-31 NOTE — DISCHARGE NOTE PROVIDER - NSDCCPTREATMENT_GEN_ALL_CORE_FT
PRINCIPAL PROCEDURE  Procedure: Cardiac ablation  Findings and Treatment: - You should restart your Eliquis.  - Do not drive or operate heavy machinery for 24 hours.  - After 24 hours, you may shower and remove the dressing from the site.  - Support the groin site with your hand when you sneeze, cough, or use the bathroom.  - No squatting, exertional activities, or lifting anything > 10 lbs for 1 week.  - Do not bathe or swim for 1 week.   - Do not rub or apply lotion, cream, or powder to the affected site. Leave it open to the air.   - Any sudden swelling, redness, fever, discharge, or severe pain, call your Cardiologist or call the Catheterization Lab at [phone].  - If there is bleeding from the puncture site, apply direct firm pressure on the site and call [phone].

## 2023-07-31 NOTE — DISCHARGE NOTE PROVIDER - HOSPITAL COURSE
Patient is a 78y Female  with PMHx of HTN, DL, glaucoma, and pAF (on Eliquis) s/p DCCV on 1/5/23 who presented to [facility] for elective AF and AFL Ablation. Patient presented to the ED with SOB recently admitted in Jan 2023 for new onset of AF with RVR and subsequently underwent successful DAT/DCCV on 1/5/23. On 7/31/23 patient underwent successful Cryoablation of the left superior, right superior, left inferior and right inferior pulmonary veins with ablation of PV potentials and entrance / exit block as endpoints. Successful CTI ablation of typical atrial flutter with bidirectional block as the endpoint. Patient was monitored overnight. On POD 1 patient remains HD stable with no complaints. Patient remains in SR with no arrhythmias noted on tele. Examination of B/L common femoral venous access sites reveal a Clear and dry area with no hematoma or erythema. (For PVI- Patient should continue Eliquis for thromboembolic prophylaxis. Patient is being DC home in stable condition. Patient is a 78y Female  with PMHx of HTN, DL, glaucoma, and pAF (on Eliquis) s/p DCCV on 1/5/23 who presented to [facility] for elective AF and AFL Ablation. Patient presented to the ED with SOB recently admitted in Jan 2023 for new onset of AF with RVR and subsequently underwent successful DAT/DCCV on 1/5/23. On 7/31/23 patient underwent successful Cryoablation of the left superior, right superior, left inferior and right inferior pulmonary veins with ablation of PV potentials and entrance / exit block as endpoints. Successful CTI ablation of typical atrial flutter with bidirectional block as the endpoint. Patient was monitored overnight. On POD 1 patient remains HD stable with no complaints. Patient remains in SR with no arrhythmias noted on tele. Examination of B/L common femoral venous access sites reveal a Clear and dry area with no hematoma or erythema.  Patient should continue Eliquis for thromboembolic prophylaxis. Patient is being DC home in stable condition.

## 2023-07-31 NOTE — DISCHARGE NOTE PROVIDER - CARE PROVIDER_API CALL
Maxx Diaz  Cardiac Electrophysiology  [address] [place] [address], [address] [address]  Phone: [phone]  Fax: [phone]  Follow Up Time: 1 month

## 2023-07-31 NOTE — PATIENT PROFILE ADULT - FALL HARM RISK - HARM RISK INTERVENTIONS

## 2023-07-31 NOTE — ASU PATIENT PROFILE, ADULT - FALL HARM RISK - PATIENT NEEDS ASSISTANCE
Pt History & Physical  Dictated on   October 23rd  Pt seen at bedside now  No change in heart and lung  Agree with procedure cystoscopy right retrograde right stent    Raisa Mcgrath MD  10/24/2018 No assistance needed

## 2023-08-01 ENCOUNTER — TRANSCRIPTION ENCOUNTER (OUTPATIENT)
Age: 78
End: 2023-08-01

## 2023-08-01 VITALS
RESPIRATION RATE: 18 BRPM | TEMPERATURE: 98 F | HEART RATE: 63 BPM | SYSTOLIC BLOOD PRESSURE: 127 MMHG | OXYGEN SATURATION: 95 % | DIASTOLIC BLOOD PRESSURE: 68 MMHG

## 2023-08-01 PROCEDURE — 99238 HOSP IP/OBS DSCHRG MGMT 30/<: CPT

## 2023-08-01 PROCEDURE — 93010 ELECTROCARDIOGRAM REPORT: CPT

## 2023-08-01 PROCEDURE — 99232 SBSQ HOSP IP/OBS MODERATE 35: CPT

## 2023-08-01 RX ORDER — SUCRALFATE 1 G
1 TABLET ORAL
Qty: 20 | Refills: 0
Start: 2023-08-01 | End: 2023-08-10

## 2023-08-01 RX ORDER — PANTOPRAZOLE SODIUM 20 MG/1
1 TABLET, DELAYED RELEASE ORAL
Qty: 30 | Refills: 0
Start: 2023-08-01 | End: 2023-08-30

## 2023-08-01 RX ORDER — AMIODARONE HYDROCHLORIDE 400 MG/1
1 TABLET ORAL
Qty: 30 | Refills: 0
Start: 2023-08-01 | End: 2023-08-30

## 2023-08-01 RX ADMIN — AMLODIPINE BESYLATE 5 MILLIGRAM(S): 2.5 TABLET ORAL at 05:21

## 2023-08-01 RX ADMIN — Medication 50 MILLIGRAM(S): at 05:22

## 2023-08-01 RX ADMIN — LOSARTAN POTASSIUM 100 MILLIGRAM(S): 100 TABLET, FILM COATED ORAL at 05:22

## 2023-08-01 RX ADMIN — Medication 1 GRAM(S): at 05:22

## 2023-08-01 RX ADMIN — PANTOPRAZOLE SODIUM 40 MILLIGRAM(S): 20 TABLET, DELAYED RELEASE ORAL at 05:21

## 2023-08-01 RX ADMIN — APIXABAN 5 MILLIGRAM(S): 2.5 TABLET, FILM COATED ORAL at 09:32

## 2023-08-01 RX ADMIN — AMIODARONE HYDROCHLORIDE 200 MILLIGRAM(S): 400 TABLET ORAL at 05:21

## 2023-08-01 NOTE — PROGRESS NOTE ADULT - SUBJECTIVE AND OBJECTIVE BOX
INTERVAL HPI/OVERNIGHT EVENTS:    Patient s/p     AF        ablation.   No events over night  Patient in NSR    MEDICATIONS  (STANDING):  aMIOdarone    Tablet 200 milliGRAM(s) Oral daily  amLODIPine   Tablet 5 milliGRAM(s) Oral daily  apixaban 5 milliGRAM(s) Oral every 12 hours  atorvastatin 20 milliGRAM(s) Oral at bedtime  hydrochlorothiazide 25 milliGRAM(s) Oral daily  latanoprost 0.005% Ophthalmic Solution 1 Drop(s) Both EYES at bedtime  losartan 100 milliGRAM(s) Oral daily  metoprolol tartrate 50 milliGRAM(s) Oral every 12 hours  pantoprazole    Tablet 40 milliGRAM(s) Oral before breakfast  sucralfate 1 Gram(s) Oral two times a day    MEDICATIONS  (PRN):  acetaminophen     Tablet .. 650 milliGRAM(s) Oral every 6 hours PRN Moderate Pain (4 - 6)  melatonin 3 milliGRAM(s) Oral at bedtime PRN Insomnia      Allergies    No Known Allergies    Intolerances          Vital Signs Last 24 Hrs  T(C): 36.5 (01 Aug 2023 07:23), Max: 36.9 (2023 23:44)  T(F): 97.7 (01 Aug 2023 07:23), Max: 98.5 (2023 23:44)  HR: 63 (01 Aug 2023 07:23) (62 - 67)  BP: 127/68 (01 Aug 2023 07:23) (122/67 - 145/73)  BP(mean): 93 (01 Aug 2023 07:23) (89 - 103)  RR: 18 (01 Aug 2023 07:23) (12 - 22)  SpO2: 95% (01 Aug 2023 07:23) (93% - 95%)    Parameters below as of 01 Aug 2023 07:23  Patient On (Oxygen Delivery Method): room air        GENERAL: In no apparent distress, well nourished, and hydrated.  HEART: Regular rate and rhythm; No murmurs, rubs, or gallops.  PULMONARY: Clear to auscultation and perfusion.  No rales, wheezing, or rhonchi bilaterally.  ABDOMEN: Soft, Nontender, Nondistended; Bowel sounds present  EXTREMITIES:  2+ Peripheral Pulses, No clubbing, cyanosis, or edema  groins No hematoma, no bleeding; stiches removed  NEUROLOGICAL: Grossly nonfocal    LABS:                EK Lead ECG:   Ventricular Rate 66 BPM    Atrial Rate 66 BPM    P-R Interval 240 ms    QRS Duration 104 ms    Q-T Interval 496 ms    QTC Calculation(Bazett) 519 ms    P Axis 55 degrees    R Axis 34 degrees    T Axis 24 degrees    Diagnosis Line Sinus rhythm with 1st degree A-V block  Low voltage QRS  Cannot rule out Anterior infarct , age undetermined  Abnormal ECG    Confirmed by CHELSEA LABOY MD ([id number]) on 2023 9:34:12 AM (23 @ 07:39)      A/P  Patient S/P   AF  Ablation  Patient is doing well  - continue Eliquis   DO NOT SKIP A SINGLE DOSE FOR THE NEXT 3 MONTH  - protonix 40 mg daily x 30 days  - No heavy lifting or exertional activities for 10days  - Can take a shower, no bathtub for 10days, do not submerge yourself in water  - FU Dr Diaz in 1 month at Dr Harkins office

## 2023-08-01 NOTE — DISCHARGE NOTE NURSING/CASE MANAGEMENT/SOCIAL WORK - NSDCPEFALRISK_GEN_ALL_CORE
For information on Fall & Injury Prevention, visit: [email or web address] OR  [email or web address] OR  https://www.cdc.gov/steadi/patient.html

## 2023-08-01 NOTE — DISCHARGE NOTE NURSING/CASE MANAGEMENT/SOCIAL WORK - PATIENT PORTAL LINK FT
You can access the FollowMyHealth Patient Portal offered by [facility] by registering at the following website: [email or web address]. By joining [facility]’s FollowMyHealth portal, you will also be able to view your health information using other applications (apps) compatible with our system.

## 2023-08-03 DIAGNOSIS — I48.3 TYPICAL ATRIAL FLUTTER: ICD-10-CM

## 2023-08-03 DIAGNOSIS — Z96.1 PRESENCE OF INTRAOCULAR LENS: ICD-10-CM

## 2023-08-03 DIAGNOSIS — Z79.01 LONG TERM (CURRENT) USE OF ANTICOAGULANTS: ICD-10-CM

## 2023-08-03 DIAGNOSIS — I48.0 PAROXYSMAL ATRIAL FIBRILLATION: ICD-10-CM

## 2023-08-03 DIAGNOSIS — E78.5 HYPERLIPIDEMIA, UNSPECIFIED: ICD-10-CM

## 2023-08-03 DIAGNOSIS — I45.9 CONDUCTION DISORDER, UNSPECIFIED: ICD-10-CM

## 2023-08-03 DIAGNOSIS — Z72.89 OTHER PROBLEMS RELATED TO LIFESTYLE: ICD-10-CM

## 2023-08-03 DIAGNOSIS — I10 ESSENTIAL (PRIMARY) HYPERTENSION: ICD-10-CM

## 2023-08-09 DIAGNOSIS — Z98.890 OTHER SPECIFIED POSTPROCEDURAL STATES: ICD-10-CM

## 2023-08-09 DIAGNOSIS — Z86.79 OTHER SPECIFIED POSTPROCEDURAL STATES: ICD-10-CM

## 2023-09-01 ENCOUNTER — APPOINTMENT (OUTPATIENT)
Dept: ELECTROPHYSIOLOGY | Facility: CLINIC | Age: 78
End: 2023-09-01
Payer: COMMERCIAL

## 2023-09-01 VITALS
HEIGHT: 64 IN | DIASTOLIC BLOOD PRESSURE: 83 MMHG | HEART RATE: 54 BPM | WEIGHT: 165 LBS | TEMPERATURE: 98 F | SYSTOLIC BLOOD PRESSURE: 137 MMHG | BODY MASS INDEX: 28.17 KG/M2

## 2023-09-01 DIAGNOSIS — I48.91 UNSPECIFIED ATRIAL FIBRILLATION: ICD-10-CM

## 2023-09-01 DIAGNOSIS — I10 ESSENTIAL (PRIMARY) HYPERTENSION: ICD-10-CM

## 2023-09-01 DIAGNOSIS — R06.02 SHORTNESS OF BREATH: ICD-10-CM

## 2023-09-01 PROCEDURE — 99214 OFFICE O/P EST MOD 30 MIN: CPT | Mod: 25

## 2023-09-01 PROCEDURE — 93000 ELECTROCARDIOGRAM COMPLETE: CPT

## 2023-09-01 RX ORDER — AMIODARONE HYDROCHLORIDE 200 MG/1
200 TABLET ORAL
Qty: 90 | Refills: 0 | Status: ACTIVE | COMMUNITY

## 2023-09-01 RX ORDER — METOPROLOL TARTRATE 50 MG/1
50 TABLET, FILM COATED ORAL
Qty: 180 | Refills: 3 | Status: ACTIVE | COMMUNITY

## 2023-09-01 RX ORDER — APIXABAN 5 MG/1
5 TABLET, FILM COATED ORAL
Qty: 60 | Refills: 0 | Status: ACTIVE | COMMUNITY

## 2023-09-01 RX ORDER — AMLODIPINE BESYLATE 5 MG/1
5 TABLET ORAL DAILY
Qty: 90 | Refills: 3 | Status: ACTIVE | COMMUNITY

## 2023-09-01 RX ORDER — ATORVASTATIN CALCIUM 20 MG/1
20 TABLET, FILM COATED ORAL
Qty: 90 | Refills: 3 | Status: ACTIVE | COMMUNITY

## 2023-09-01 RX ORDER — SUCRALFATE 1 G/1
1 TABLET ORAL
Qty: 60 | Refills: 0 | Status: ACTIVE | COMMUNITY
Start: 2023-08-09 | End: 1900-01-01

## 2023-09-01 RX ORDER — PANTOPRAZOLE 40 MG/1
40 TABLET, DELAYED RELEASE ORAL
Qty: 30 | Refills: 0 | Status: ACTIVE | COMMUNITY
Start: 2023-08-09 | End: 1900-01-01

## 2023-09-01 RX ORDER — LOSARTAN POTASSIUM AND HYDROCHLOROTHIAZIDE 25; 100 MG/1; MG/1
100-25 TABLET ORAL
Qty: 90 | Refills: 3 | Status: ACTIVE | COMMUNITY

## 2023-09-04 PROBLEM — I10 HYPERTENSION: Status: ACTIVE | Noted: 2023-09-04

## 2023-09-04 PROBLEM — I48.91 ATRIAL FIBRILLATION, UNSPECIFIED TYPE: Status: ACTIVE | Noted: 2023-08-09

## 2023-09-04 NOTE — ADDENDUM
[FreeTextEntry1] : Dede TOWNSEND assisted in documentation on 09/04/2023   acting as a scribe for Dr. Maxx Diaz.

## 2023-09-04 NOTE — HISTORY OF PRESENT ILLNESS
[FreeTextEntry1] : Patient is a very pleasant 78-year-old woman with history of hypertension, paroxysmal atrial fibrillation s/p multiple cardioversions in the past, who underwent successful AF ablation on July 31, 2023, CHADSVASC of 3. Patient comes to the office for follow-up. Patient is doing great, currently in normal sinus rhythm and is feeling well. Growing has healed well. No other complaints.

## 2023-09-04 NOTE — PHYSICAL EXAM
[Well Developed] : well developed [Well Nourished] : well nourished [Normal Conjunctiva] : normal conjunctiva [No Acute Distress] : no acute distress [Normal Venous Pressure] : normal venous pressure [No Carotid Bruit] : no carotid bruit [Normal S1, S2] : normal S1, S2 [No Murmur] : no murmur [No Rub] : no rub [No Gallop] : no gallop [Clear Lung Fields] : clear lung fields [Good Air Entry] : good air entry [Soft] : abdomen soft [No Respiratory Distress] : no respiratory distress  [Non Tender] : non-tender [No Masses/organomegaly] : no masses/organomegaly [Normal Bowel Sounds] : normal bowel sounds [Normal Gait] : normal gait [No Edema] : no edema [No Clubbing] : no clubbing [No Cyanosis] : no cyanosis [No Varicosities] : no varicosities [No Rash] : no rash [No Skin Lesions] : no skin lesions [Moves all extremities] : moves all extremities [No Focal Deficits] : no focal deficits [Alert and Oriented] : alert and oriented [Normal Speech] : normal speech [Normal memory] : normal memory

## 2023-09-04 NOTE — ASSESSMENT
[FreeTextEntry1] : PAF  - CHADSVASC of 3. - Continue Eliquis 5 mg q12, no bleeding.  - Discontinued Amiodarone.  - Patient is doing great, will continue to monitor.   HTN  - Patient's pressure is well controlled.  - Continue Amlodipine 5 mg once a day.  - Continue Metoprolol 50 mg once a day.  - Continue Losartan HCTZ 100-25 mg once a day.

## 2024-01-04 NOTE — PATIENT PROFILE ADULT - NSPROMEDSADMININFO_GEN_A_NUR
The patient is a  presenting to OB triage at 22w3d complaining of left sided flank pain that radiates along her side towards her lower abdomen that has been occurring on and off the last few days along with urinary frequency. The patient reports she currently has a dull pain that is a 3/10 and she does not desire pain medication at this time, but the pain has escalated to an 8/10 at some points throughout the last few days. The patient denies any obstetrical complaints at this time. Vital signs WDL. FHTs obtained via US ([phone]) and the patient reports feeling good fetal movement. Dr. Mannquest notified of patient's arrival. Awaiting further evaluation and orders.   no concerns

## 2024-10-12 NOTE — DISCHARGE NOTE PROVIDER - HOSPITAL COURSE
CC:  NEW ONSET ATRIAL FIBRILLATION; SHORTNESS OF BREATH; HYPOKALEMIA    HOSPITAL COURSE:   77F. PMH: non-smoking, HTN, DLD, and glaucoma presented 1/4 for SOB x1, 1 week prior she had the flu vaccine and Covid booster at the same time, after that she started having SOB both at rest and on exertion. She denies any prior cardiac or pulm history.     In the ED she was found to have new onset Afib with RVR, s/p Cardizem pushes and started on cardizem drip.  Vitals otherwise stable  CXR on wet read showed bilateral pleural effusion  EKG: Afib with RVR with non specific ST and T wave abnormalities   trops negative  Labs showed: K2.9, Bilirubin 1.4, , Pro-  Patient admitted to [facility] for monitoring and management of new onset Afib and r/o acute CHF.  S/p Cardioversion w/ EP - successful   Completed amiodarone infusion and transitioned to PO
35.7

## 2025-08-07 ENCOUNTER — INPATIENT (INPATIENT)
Facility: HOSPITAL | Age: 80
LOS: 6 days | Discharge: HOME | DRG: 310 | End: 2025-08-14
Attending: INTERNAL MEDICINE | Admitting: STUDENT IN AN ORGANIZED HEALTH CARE EDUCATION/TRAINING PROGRAM
Payer: COMMERCIAL

## 2025-08-07 VITALS
OXYGEN SATURATION: 95 % | SYSTOLIC BLOOD PRESSURE: 144 MMHG | HEART RATE: 135 BPM | RESPIRATION RATE: 23 BRPM | WEIGHT: 179.9 LBS | TEMPERATURE: 98 F | DIASTOLIC BLOOD PRESSURE: 98 MMHG | HEIGHT: 64 IN

## 2025-08-07 DIAGNOSIS — Z98.83 FILTERING (VITREOUS) BLEB AFTER GLAUCOMA SURGERY STATUS: Chronic | ICD-10-CM

## 2025-08-07 DIAGNOSIS — Z98.890 OTHER SPECIFIED POSTPROCEDURAL STATES: Chronic | ICD-10-CM

## 2025-08-07 DIAGNOSIS — I48.91 UNSPECIFIED ATRIAL FIBRILLATION: ICD-10-CM

## 2025-08-07 LAB
ALBUMIN SERPL ELPH-MCNC: 4.6 G/DL — SIGNIFICANT CHANGE UP (ref 3.5–5.2)
ALP SERPL-CCNC: 105 U/L — SIGNIFICANT CHANGE UP (ref 30–115)
ALT FLD-CCNC: 35 U/L — SIGNIFICANT CHANGE UP (ref 0–41)
ANION GAP SERPL CALC-SCNC: 13 MMOL/L — SIGNIFICANT CHANGE UP (ref 7–14)
AST SERPL-CCNC: 34 U/L — SIGNIFICANT CHANGE UP (ref 0–41)
BASOPHILS # BLD AUTO: 0.05 K/UL — SIGNIFICANT CHANGE UP (ref 0–0.2)
BASOPHILS NFR BLD AUTO: 0.8 % — SIGNIFICANT CHANGE UP (ref 0–1)
BILIRUB SERPL-MCNC: 1.6 MG/DL — HIGH (ref 0.2–1.2)
BUN SERPL-MCNC: 25 MG/DL — HIGH (ref 10–20)
CALCIUM SERPL-MCNC: 9.7 MG/DL — SIGNIFICANT CHANGE UP (ref 8.4–10.5)
CHLORIDE SERPL-SCNC: 104 MMOL/L — SIGNIFICANT CHANGE UP (ref 98–110)
CO2 SERPL-SCNC: 23 MMOL/L — SIGNIFICANT CHANGE UP (ref 17–32)
CREAT SERPL-MCNC: 1 MG/DL — SIGNIFICANT CHANGE UP (ref 0.7–1.5)
EGFR: 58 ML/MIN/1.73M2 — LOW
EGFR: 58 ML/MIN/1.73M2 — LOW
EOSINOPHIL # BLD AUTO: 0.12 K/UL — SIGNIFICANT CHANGE UP (ref 0–0.7)
EOSINOPHIL NFR BLD AUTO: 1.9 % — SIGNIFICANT CHANGE UP (ref 0–8)
FLUAV AG NPH QL: SIGNIFICANT CHANGE UP
FLUBV AG NPH QL: SIGNIFICANT CHANGE UP
GAS PNL BLDV: SIGNIFICANT CHANGE UP
GLUCOSE SERPL-MCNC: 125 MG/DL — HIGH (ref 70–99)
HCT VFR BLD CALC: 41.3 % — SIGNIFICANT CHANGE UP (ref 37–47)
HGB BLD-MCNC: 14.5 G/DL — SIGNIFICANT CHANGE UP (ref 12–16)
IMM GRANULOCYTES NFR BLD AUTO: 0.2 % — SIGNIFICANT CHANGE UP (ref 0.1–0.3)
LYMPHOCYTES # BLD AUTO: 1.65 K/UL — SIGNIFICANT CHANGE UP (ref 1.2–3.4)
LYMPHOCYTES # BLD AUTO: 25.9 % — SIGNIFICANT CHANGE UP (ref 20.5–51.1)
MAGNESIUM SERPL-MCNC: 2.2 MG/DL — SIGNIFICANT CHANGE UP (ref 1.8–2.4)
MCHC RBC-ENTMCNC: 29.8 PG — SIGNIFICANT CHANGE UP (ref 27–31)
MCHC RBC-ENTMCNC: 35.1 G/DL — SIGNIFICANT CHANGE UP (ref 32–37)
MCV RBC AUTO: 85 FL — SIGNIFICANT CHANGE UP (ref 81–99)
MONOCYTES # BLD AUTO: 0.6 K/UL — SIGNIFICANT CHANGE UP (ref 0.1–0.6)
MONOCYTES NFR BLD AUTO: 9.4 % — HIGH (ref 1.7–9.3)
NEUTROPHILS # BLD AUTO: 3.93 K/UL — SIGNIFICANT CHANGE UP (ref 1.4–6.5)
NEUTROPHILS NFR BLD AUTO: 61.8 % — SIGNIFICANT CHANGE UP (ref 42.2–75.2)
NRBC BLD AUTO-RTO: 0 /100 WBCS — SIGNIFICANT CHANGE UP (ref 0–0)
NT-PROBNP SERPL-SCNC: 2283 PG/ML — HIGH (ref 0–300)
PLATELET # BLD AUTO: 254 K/UL — SIGNIFICANT CHANGE UP (ref 130–400)
PMV BLD: 11.3 FL — HIGH (ref 7.4–10.4)
POTASSIUM SERPL-MCNC: 3.4 MMOL/L — LOW (ref 3.5–5)
POTASSIUM SERPL-SCNC: 3.4 MMOL/L — LOW (ref 3.5–5)
PROT SERPL-MCNC: 7.4 G/DL — SIGNIFICANT CHANGE UP (ref 6–8)
RBC # BLD: 4.86 M/UL — SIGNIFICANT CHANGE UP (ref 4.2–5.4)
RBC # FLD: 12.9 % — SIGNIFICANT CHANGE UP (ref 11.5–14.5)
RSV RNA NPH QL NAA+NON-PROBE: SIGNIFICANT CHANGE UP
SARS-COV-2 RNA SPEC QL NAA+PROBE: SIGNIFICANT CHANGE UP
SODIUM SERPL-SCNC: 140 MMOL/L — SIGNIFICANT CHANGE UP (ref 135–146)
SOURCE RESPIRATORY: SIGNIFICANT CHANGE UP
TROPONIN T, HIGH SENSITIVITY RESULT: 12 NG/L — SIGNIFICANT CHANGE UP (ref 6–13)
TROPONIN T, HIGH SENSITIVITY RESULT: 14 NG/L — HIGH (ref 6–13)
WBC # BLD: 6.36 K/UL — SIGNIFICANT CHANGE UP (ref 4.8–10.8)
WBC # FLD AUTO: 6.36 K/UL — SIGNIFICANT CHANGE UP (ref 4.8–10.8)

## 2025-08-07 PROCEDURE — 83036 HEMOGLOBIN GLYCOSYLATED A1C: CPT

## 2025-08-07 PROCEDURE — 36415 COLL VENOUS BLD VENIPUNCTURE: CPT

## 2025-08-07 PROCEDURE — 93307 TTE W/O DOPPLER COMPLETE: CPT

## 2025-08-07 PROCEDURE — 82043 UR ALBUMIN QUANTITATIVE: CPT

## 2025-08-07 PROCEDURE — 83735 ASSAY OF MAGNESIUM: CPT

## 2025-08-07 PROCEDURE — 99291 CRITICAL CARE FIRST HOUR: CPT

## 2025-08-07 PROCEDURE — 93312 ECHO TRANSESOPHAGEAL: CPT

## 2025-08-07 PROCEDURE — 84443 ASSAY THYROID STIM HORMONE: CPT

## 2025-08-07 PROCEDURE — 71045 X-RAY EXAM CHEST 1 VIEW: CPT | Mod: 26

## 2025-08-07 PROCEDURE — 93308 TTE F-UP OR LMTD: CPT

## 2025-08-07 PROCEDURE — 99223 1ST HOSP IP/OBS HIGH 75: CPT

## 2025-08-07 PROCEDURE — 80048 BASIC METABOLIC PNL TOTAL CA: CPT

## 2025-08-07 PROCEDURE — 84439 ASSAY OF FREE THYROXINE: CPT

## 2025-08-07 PROCEDURE — 80061 LIPID PANEL: CPT

## 2025-08-07 PROCEDURE — 71045 X-RAY EXAM CHEST 1 VIEW: CPT

## 2025-08-07 PROCEDURE — 85025 COMPLETE CBC W/AUTO DIFF WBC: CPT

## 2025-08-07 PROCEDURE — 87637 SARSCOV2&INF A&B&RSV AMP PRB: CPT

## 2025-08-07 PROCEDURE — 93320 DOPPLER ECHO COMPLETE: CPT

## 2025-08-07 PROCEDURE — 80053 COMPREHEN METABOLIC PANEL: CPT

## 2025-08-07 PROCEDURE — 93325 DOPPLER ECHO COLOR FLOW MAPG: CPT

## 2025-08-07 RX ORDER — ONDANSETRON HCL/PF 4 MG/2 ML
4 VIAL (ML) INJECTION EVERY 8 HOURS
Refills: 0 | Status: DISCONTINUED | OUTPATIENT
Start: 2025-08-07 | End: 2025-08-08

## 2025-08-07 RX ORDER — APIXABAN 5 MG/1
5 TABLET, FILM COATED ORAL
Refills: 0 | Status: DISCONTINUED | OUTPATIENT
Start: 2025-08-07 | End: 2025-08-11

## 2025-08-07 RX ORDER — ATORVASTATIN CALCIUM 80 MG/1
20 TABLET, FILM COATED ORAL AT BEDTIME
Refills: 0 | Status: DISCONTINUED | OUTPATIENT
Start: 2025-08-07 | End: 2025-08-14

## 2025-08-07 RX ORDER — MAGNESIUM, ALUMINUM HYDROXIDE 200-200 MG
30 TABLET,CHEWABLE ORAL EVERY 4 HOURS
Refills: 0 | Status: DISCONTINUED | OUTPATIENT
Start: 2025-08-07 | End: 2025-08-14

## 2025-08-07 RX ORDER — METOPROLOL SUCCINATE 50 MG/1
50 TABLET, EXTENDED RELEASE ORAL
Refills: 0 | Status: DISCONTINUED | OUTPATIENT
Start: 2025-08-07 | End: 2025-08-07

## 2025-08-07 RX ORDER — FUROSEMIDE 10 MG/ML
40 INJECTION INTRAMUSCULAR; INTRAVENOUS DAILY
Refills: 0 | Status: DISCONTINUED | OUTPATIENT
Start: 2025-08-07 | End: 2025-08-08

## 2025-08-07 RX ORDER — METOPROLOL SUCCINATE 50 MG/1
5 TABLET, EXTENDED RELEASE ORAL ONCE
Refills: 0 | Status: COMPLETED | OUTPATIENT
Start: 2025-08-07 | End: 2025-08-07

## 2025-08-07 RX ORDER — LATANOPROST PF 0.05 MG/ML
1 SOLUTION/ DROPS OPHTHALMIC AT BEDTIME
Refills: 0 | Status: DISCONTINUED | OUTPATIENT
Start: 2025-08-07 | End: 2025-08-14

## 2025-08-07 RX ORDER — METOPROLOL SUCCINATE 50 MG/1
50 TABLET, EXTENDED RELEASE ORAL ONCE
Refills: 0 | Status: DISCONTINUED | OUTPATIENT
Start: 2025-08-07 | End: 2025-08-07

## 2025-08-07 RX ORDER — MELATONIN 5 MG
3 TABLET ORAL AT BEDTIME
Refills: 0 | Status: DISCONTINUED | OUTPATIENT
Start: 2025-08-07 | End: 2025-08-14

## 2025-08-07 RX ORDER — METOPROLOL SUCCINATE 50 MG/1
25 TABLET, EXTENDED RELEASE ORAL ONCE
Refills: 0 | Status: COMPLETED | OUTPATIENT
Start: 2025-08-07 | End: 2025-08-07

## 2025-08-07 RX ORDER — AMLODIPINE BESYLATE 10 MG/1
5 TABLET ORAL DAILY
Refills: 0 | Status: DISCONTINUED | OUTPATIENT
Start: 2025-08-07 | End: 2025-08-11

## 2025-08-07 RX ORDER — ACETAMINOPHEN 500 MG/5ML
650 LIQUID (ML) ORAL EVERY 6 HOURS
Refills: 0 | Status: DISCONTINUED | OUTPATIENT
Start: 2025-08-07 | End: 2025-08-14

## 2025-08-07 RX ORDER — METOPROLOL SUCCINATE 50 MG/1
50 TABLET, EXTENDED RELEASE ORAL EVERY 8 HOURS
Refills: 0 | Status: DISCONTINUED | OUTPATIENT
Start: 2025-08-08 | End: 2025-08-08

## 2025-08-07 RX ADMIN — METOPROLOL SUCCINATE 25 MILLIGRAM(S): 50 TABLET, EXTENDED RELEASE ORAL at 21:24

## 2025-08-07 RX ADMIN — LATANOPROST PF 1 DROP(S): 0.05 SOLUTION/ DROPS OPHTHALMIC at 21:21

## 2025-08-07 RX ADMIN — ATORVASTATIN CALCIUM 20 MILLIGRAM(S): 80 TABLET, FILM COATED ORAL at 21:24

## 2025-08-07 RX ADMIN — FUROSEMIDE 40 MILLIGRAM(S): 10 INJECTION INTRAMUSCULAR; INTRAVENOUS at 19:00

## 2025-08-07 RX ADMIN — Medication 40 MILLIEQUIVALENT(S): at 17:37

## 2025-08-07 RX ADMIN — METOPROLOL SUCCINATE 50 MILLIGRAM(S): 50 TABLET, EXTENDED RELEASE ORAL at 17:18

## 2025-08-07 RX ADMIN — METOPROLOL SUCCINATE 5 MILLIGRAM(S): 50 TABLET, EXTENDED RELEASE ORAL at 11:05

## 2025-08-07 RX ADMIN — Medication 40 MILLIEQUIVALENT(S): at 21:24

## 2025-08-07 RX ADMIN — METOPROLOL SUCCINATE 5 MILLIGRAM(S): 50 TABLET, EXTENDED RELEASE ORAL at 12:03

## 2025-08-08 ENCOUNTER — RESULT REVIEW (OUTPATIENT)
Age: 80
End: 2025-08-08

## 2025-08-08 LAB
A1C WITH ESTIMATED AVERAGE GLUCOSE RESULT: 6.2 % — HIGH (ref 4–5.6)
ALBUMIN SERPL ELPH-MCNC: 4.2 G/DL — SIGNIFICANT CHANGE UP (ref 3.5–5.2)
ALP SERPL-CCNC: 92 U/L — SIGNIFICANT CHANGE UP (ref 30–115)
ALT FLD-CCNC: 27 U/L — SIGNIFICANT CHANGE UP (ref 0–41)
ANION GAP SERPL CALC-SCNC: 10 MMOL/L — SIGNIFICANT CHANGE UP (ref 7–14)
ANION GAP SERPL CALC-SCNC: 15 MMOL/L — HIGH (ref 7–14)
AST SERPL-CCNC: 23 U/L — SIGNIFICANT CHANGE UP (ref 0–41)
BASOPHILS # BLD AUTO: 0.06 K/UL — SIGNIFICANT CHANGE UP (ref 0–0.2)
BASOPHILS NFR BLD AUTO: 0.8 % — SIGNIFICANT CHANGE UP (ref 0–1)
BILIRUB SERPL-MCNC: 1.7 MG/DL — HIGH (ref 0.2–1.2)
BUN SERPL-MCNC: 21 MG/DL — HIGH (ref 10–20)
BUN SERPL-MCNC: 23 MG/DL — HIGH (ref 10–20)
CALCIUM SERPL-MCNC: 8.9 MG/DL — SIGNIFICANT CHANGE UP (ref 8.4–10.5)
CALCIUM SERPL-MCNC: 9 MG/DL — SIGNIFICANT CHANGE UP (ref 8.4–10.5)
CHLORIDE SERPL-SCNC: 101 MMOL/L — SIGNIFICANT CHANGE UP (ref 98–110)
CHLORIDE SERPL-SCNC: 105 MMOL/L — SIGNIFICANT CHANGE UP (ref 98–110)
CHOLEST SERPL-MCNC: 147 MG/DL — SIGNIFICANT CHANGE UP
CO2 SERPL-SCNC: 21 MMOL/L — SIGNIFICANT CHANGE UP (ref 17–32)
CO2 SERPL-SCNC: 26 MMOL/L — SIGNIFICANT CHANGE UP (ref 17–32)
CREAT SERPL-MCNC: 0.8 MG/DL — SIGNIFICANT CHANGE UP (ref 0.7–1.5)
CREAT SERPL-MCNC: 1 MG/DL — SIGNIFICANT CHANGE UP (ref 0.7–1.5)
EGFR: 58 ML/MIN/1.73M2 — LOW
EGFR: 58 ML/MIN/1.73M2 — LOW
EGFR: 75 ML/MIN/1.73M2 — SIGNIFICANT CHANGE UP
EGFR: 75 ML/MIN/1.73M2 — SIGNIFICANT CHANGE UP
EOSINOPHIL # BLD AUTO: 0.11 K/UL — SIGNIFICANT CHANGE UP (ref 0–0.7)
EOSINOPHIL NFR BLD AUTO: 1.5 % — SIGNIFICANT CHANGE UP (ref 0–8)
ESTIMATED AVERAGE GLUCOSE: 131 MG/DL — HIGH (ref 68–114)
GLUCOSE SERPL-MCNC: 117 MG/DL — HIGH (ref 70–99)
GLUCOSE SERPL-MCNC: 119 MG/DL — HIGH (ref 70–99)
HCT VFR BLD CALC: 38.3 % — SIGNIFICANT CHANGE UP (ref 37–47)
HDLC SERPL-MCNC: 40 MG/DL — LOW
HGB BLD-MCNC: 13.5 G/DL — SIGNIFICANT CHANGE UP (ref 12–16)
IMM GRANULOCYTES NFR BLD AUTO: 0.3 % — SIGNIFICANT CHANGE UP (ref 0.1–0.3)
LDLC SERPL-MCNC: 80 MG/DL — SIGNIFICANT CHANGE UP
LIPID PNL WITH DIRECT LDL SERPL: 80 MG/DL — SIGNIFICANT CHANGE UP
LYMPHOCYTES # BLD AUTO: 2.06 K/UL — SIGNIFICANT CHANGE UP (ref 1.2–3.4)
LYMPHOCYTES # BLD AUTO: 27.6 % — SIGNIFICANT CHANGE UP (ref 20.5–51.1)
MAGNESIUM SERPL-MCNC: 1.9 MG/DL — SIGNIFICANT CHANGE UP (ref 1.8–2.4)
MCHC RBC-ENTMCNC: 29.9 PG — SIGNIFICANT CHANGE UP (ref 27–31)
MCHC RBC-ENTMCNC: 35.2 G/DL — SIGNIFICANT CHANGE UP (ref 32–37)
MCV RBC AUTO: 84.9 FL — SIGNIFICANT CHANGE UP (ref 81–99)
MONOCYTES # BLD AUTO: 0.78 K/UL — HIGH (ref 0.1–0.6)
MONOCYTES NFR BLD AUTO: 10.4 % — HIGH (ref 1.7–9.3)
NEUTROPHILS # BLD AUTO: 4.44 K/UL — SIGNIFICANT CHANGE UP (ref 1.4–6.5)
NEUTROPHILS NFR BLD AUTO: 59.4 % — SIGNIFICANT CHANGE UP (ref 42.2–75.2)
NONHDLC SERPL-MCNC: 107 MG/DL — SIGNIFICANT CHANGE UP
NRBC BLD AUTO-RTO: 0 /100 WBCS — SIGNIFICANT CHANGE UP (ref 0–0)
PLATELET # BLD AUTO: 214 K/UL — SIGNIFICANT CHANGE UP (ref 130–400)
PMV BLD: 11.9 FL — HIGH (ref 7.4–10.4)
POTASSIUM SERPL-MCNC: 3.8 MMOL/L — SIGNIFICANT CHANGE UP (ref 3.5–5)
POTASSIUM SERPL-MCNC: 3.9 MMOL/L — SIGNIFICANT CHANGE UP (ref 3.5–5)
POTASSIUM SERPL-SCNC: 3.8 MMOL/L — SIGNIFICANT CHANGE UP (ref 3.5–5)
POTASSIUM SERPL-SCNC: 3.9 MMOL/L — SIGNIFICANT CHANGE UP (ref 3.5–5)
PROT SERPL-MCNC: 6.6 G/DL — SIGNIFICANT CHANGE UP (ref 6–8)
RBC # BLD: 4.51 M/UL — SIGNIFICANT CHANGE UP (ref 4.2–5.4)
RBC # FLD: 13 % — SIGNIFICANT CHANGE UP (ref 11.5–14.5)
SODIUM SERPL-SCNC: 137 MMOL/L — SIGNIFICANT CHANGE UP (ref 135–146)
SODIUM SERPL-SCNC: 141 MMOL/L — SIGNIFICANT CHANGE UP (ref 135–146)
T4 FREE SERPL-MCNC: 1.6 NG/DL — SIGNIFICANT CHANGE UP (ref 0.9–1.8)
TRIGL SERPL-MCNC: 155 MG/DL — HIGH
TSH SERPL-MCNC: 3.71 UIU/ML — SIGNIFICANT CHANGE UP (ref 0.27–4.2)
WBC # BLD: 7.47 K/UL — SIGNIFICANT CHANGE UP (ref 4.8–10.8)
WBC # FLD AUTO: 7.47 K/UL — SIGNIFICANT CHANGE UP (ref 4.8–10.8)

## 2025-08-08 PROCEDURE — 99223 1ST HOSP IP/OBS HIGH 75: CPT

## 2025-08-08 PROCEDURE — 93306 TTE W/DOPPLER COMPLETE: CPT | Mod: 26

## 2025-08-08 PROCEDURE — 99233 SBSQ HOSP IP/OBS HIGH 50: CPT

## 2025-08-08 PROCEDURE — 71045 X-RAY EXAM CHEST 1 VIEW: CPT | Mod: 26

## 2025-08-08 RX ORDER — METOPROLOL SUCCINATE 50 MG/1
5 TABLET, EXTENDED RELEASE ORAL ONCE
Refills: 0 | Status: COMPLETED | OUTPATIENT
Start: 2025-08-08 | End: 2025-08-08

## 2025-08-08 RX ORDER — DILTIAZEM HYDROCHLORIDE 240 MG/1
10 TABLET, EXTENDED RELEASE ORAL ONCE
Refills: 0 | Status: COMPLETED | OUTPATIENT
Start: 2025-08-08 | End: 2025-08-08

## 2025-08-08 RX ORDER — FUROSEMIDE 10 MG/ML
40 INJECTION INTRAMUSCULAR; INTRAVENOUS EVERY 12 HOURS
Refills: 0 | Status: COMPLETED | OUTPATIENT
Start: 2025-08-08 | End: 2025-08-08

## 2025-08-08 RX ORDER — FUROSEMIDE 10 MG/ML
40 INJECTION INTRAMUSCULAR; INTRAVENOUS DAILY
Refills: 0 | Status: DISCONTINUED | OUTPATIENT
Start: 2025-08-09 | End: 2025-08-14

## 2025-08-08 RX ORDER — METOPROLOL SUCCINATE 50 MG/1
50 TABLET, EXTENDED RELEASE ORAL EVERY 6 HOURS
Refills: 0 | Status: DISCONTINUED | OUTPATIENT
Start: 2025-08-08 | End: 2025-08-10

## 2025-08-08 RX ORDER — METOPROLOL SUCCINATE 50 MG/1
25 TABLET, EXTENDED RELEASE ORAL ONCE
Refills: 0 | Status: COMPLETED | OUTPATIENT
Start: 2025-08-08 | End: 2025-08-08

## 2025-08-08 RX ADMIN — Medication 20 MILLIEQUIVALENT(S): at 10:51

## 2025-08-08 RX ADMIN — AMLODIPINE BESYLATE 5 MILLIGRAM(S): 10 TABLET ORAL at 00:05

## 2025-08-08 RX ADMIN — METOPROLOL SUCCINATE 50 MILLIGRAM(S): 50 TABLET, EXTENDED RELEASE ORAL at 11:05

## 2025-08-08 RX ADMIN — Medication 650 MILLIGRAM(S): at 11:24

## 2025-08-08 RX ADMIN — METOPROLOL SUCCINATE 5 MILLIGRAM(S): 50 TABLET, EXTENDED RELEASE ORAL at 07:09

## 2025-08-08 RX ADMIN — ATORVASTATIN CALCIUM 20 MILLIGRAM(S): 80 TABLET, FILM COATED ORAL at 21:26

## 2025-08-08 RX ADMIN — Medication 650 MILLIGRAM(S): at 17:10

## 2025-08-08 RX ADMIN — LATANOPROST PF 1 DROP(S): 0.05 SOLUTION/ DROPS OPHTHALMIC at 22:19

## 2025-08-08 RX ADMIN — Medication 650 MILLIGRAM(S): at 17:09

## 2025-08-08 RX ADMIN — METOPROLOL SUCCINATE 25 MILLIGRAM(S): 50 TABLET, EXTENDED RELEASE ORAL at 01:00

## 2025-08-08 RX ADMIN — METOPROLOL SUCCINATE 5 MILLIGRAM(S): 50 TABLET, EXTENDED RELEASE ORAL at 02:19

## 2025-08-08 RX ADMIN — AMLODIPINE BESYLATE 5 MILLIGRAM(S): 10 TABLET ORAL at 05:26

## 2025-08-08 RX ADMIN — METOPROLOL SUCCINATE 50 MILLIGRAM(S): 50 TABLET, EXTENDED RELEASE ORAL at 00:05

## 2025-08-08 RX ADMIN — APIXABAN 5 MILLIGRAM(S): 5 TABLET, FILM COATED ORAL at 17:10

## 2025-08-08 RX ADMIN — Medication 650 MILLIGRAM(S): at 10:51

## 2025-08-08 RX ADMIN — APIXABAN 5 MILLIGRAM(S): 5 TABLET, FILM COATED ORAL at 05:26

## 2025-08-08 RX ADMIN — FUROSEMIDE 40 MILLIGRAM(S): 10 INJECTION INTRAMUSCULAR; INTRAVENOUS at 17:10

## 2025-08-08 RX ADMIN — DILTIAZEM HYDROCHLORIDE 10 MILLIGRAM(S): 240 TABLET, EXTENDED RELEASE ORAL at 03:09

## 2025-08-08 RX ADMIN — METOPROLOL SUCCINATE 50 MILLIGRAM(S): 50 TABLET, EXTENDED RELEASE ORAL at 17:09

## 2025-08-08 RX ADMIN — METOPROLOL SUCCINATE 50 MILLIGRAM(S): 50 TABLET, EXTENDED RELEASE ORAL at 08:13

## 2025-08-09 LAB
ALBUMIN, RANDOM URINE: <1.2 MG/DL — SIGNIFICANT CHANGE UP
ALBUMIN/CREATININE RATIO (ACR): SIGNIFICANT CHANGE UP MG/G (ref 0–30)
ANION GAP SERPL CALC-SCNC: 13 MMOL/L — SIGNIFICANT CHANGE UP (ref 7–14)
ANION GAP SERPL CALC-SCNC: 14 MMOL/L — SIGNIFICANT CHANGE UP (ref 7–14)
BASOPHILS # BLD AUTO: 0.04 K/UL — SIGNIFICANT CHANGE UP (ref 0–0.2)
BASOPHILS NFR BLD AUTO: 0.6 % — SIGNIFICANT CHANGE UP (ref 0–1)
BUN SERPL-MCNC: 20 MG/DL — SIGNIFICANT CHANGE UP (ref 10–20)
BUN SERPL-MCNC: 22 MG/DL — HIGH (ref 10–20)
CALCIUM SERPL-MCNC: 9.3 MG/DL — SIGNIFICANT CHANGE UP (ref 8.4–10.5)
CALCIUM SERPL-MCNC: 9.6 MG/DL — SIGNIFICANT CHANGE UP (ref 8.4–10.5)
CHLORIDE SERPL-SCNC: 102 MMOL/L — SIGNIFICANT CHANGE UP (ref 98–110)
CHLORIDE SERPL-SCNC: 104 MMOL/L — SIGNIFICANT CHANGE UP (ref 98–110)
CO2 SERPL-SCNC: 21 MMOL/L — SIGNIFICANT CHANGE UP (ref 17–32)
CO2 SERPL-SCNC: 26 MMOL/L — SIGNIFICANT CHANGE UP (ref 17–32)
CREAT ?TM UR-MCNC: 58 MG/DL — SIGNIFICANT CHANGE UP
CREAT SERPL-MCNC: 0.8 MG/DL — SIGNIFICANT CHANGE UP (ref 0.7–1.5)
CREAT SERPL-MCNC: 1.1 MG/DL — SIGNIFICANT CHANGE UP (ref 0.7–1.5)
EGFR: 51 ML/MIN/1.73M2 — LOW
EGFR: 51 ML/MIN/1.73M2 — LOW
EGFR: 75 ML/MIN/1.73M2 — SIGNIFICANT CHANGE UP
EGFR: 75 ML/MIN/1.73M2 — SIGNIFICANT CHANGE UP
EOSINOPHIL # BLD AUTO: 0.19 K/UL — SIGNIFICANT CHANGE UP (ref 0–0.7)
EOSINOPHIL NFR BLD AUTO: 2.9 % — SIGNIFICANT CHANGE UP (ref 0–8)
GLUCOSE SERPL-MCNC: 121 MG/DL — HIGH (ref 70–99)
GLUCOSE SERPL-MCNC: 170 MG/DL — HIGH (ref 70–99)
HCT VFR BLD CALC: 41 % — SIGNIFICANT CHANGE UP (ref 37–47)
HGB BLD-MCNC: 14.3 G/DL — SIGNIFICANT CHANGE UP (ref 12–16)
IMM GRANULOCYTES NFR BLD AUTO: 0.3 % — SIGNIFICANT CHANGE UP (ref 0.1–0.3)
LYMPHOCYTES # BLD AUTO: 2.3 K/UL — SIGNIFICANT CHANGE UP (ref 1.2–3.4)
LYMPHOCYTES # BLD AUTO: 35.5 % — SIGNIFICANT CHANGE UP (ref 20.5–51.1)
MAGNESIUM SERPL-MCNC: 1.8 MG/DL — SIGNIFICANT CHANGE UP (ref 1.8–2.4)
MAGNESIUM SERPL-MCNC: 1.9 MG/DL — SIGNIFICANT CHANGE UP (ref 1.8–2.4)
MCHC RBC-ENTMCNC: 29.5 PG — SIGNIFICANT CHANGE UP (ref 27–31)
MCHC RBC-ENTMCNC: 34.9 G/DL — SIGNIFICANT CHANGE UP (ref 32–37)
MCV RBC AUTO: 84.5 FL — SIGNIFICANT CHANGE UP (ref 81–99)
MONOCYTES # BLD AUTO: 0.76 K/UL — HIGH (ref 0.1–0.6)
MONOCYTES NFR BLD AUTO: 11.7 % — HIGH (ref 1.7–9.3)
NEUTROPHILS # BLD AUTO: 3.17 K/UL — SIGNIFICANT CHANGE UP (ref 1.4–6.5)
NEUTROPHILS NFR BLD AUTO: 49 % — SIGNIFICANT CHANGE UP (ref 42.2–75.2)
NRBC BLD AUTO-RTO: 0 /100 WBCS — SIGNIFICANT CHANGE UP (ref 0–0)
PLATELET # BLD AUTO: 243 K/UL — SIGNIFICANT CHANGE UP (ref 130–400)
PMV BLD: 11.8 FL — HIGH (ref 7.4–10.4)
POTASSIUM SERPL-MCNC: 3.6 MMOL/L — SIGNIFICANT CHANGE UP (ref 3.5–5)
POTASSIUM SERPL-MCNC: 4.3 MMOL/L — SIGNIFICANT CHANGE UP (ref 3.5–5)
POTASSIUM SERPL-SCNC: 3.6 MMOL/L — SIGNIFICANT CHANGE UP (ref 3.5–5)
POTASSIUM SERPL-SCNC: 4.3 MMOL/L — SIGNIFICANT CHANGE UP (ref 3.5–5)
RBC # BLD: 4.85 M/UL — SIGNIFICANT CHANGE UP (ref 4.2–5.4)
RBC # FLD: 13.1 % — SIGNIFICANT CHANGE UP (ref 11.5–14.5)
SODIUM SERPL-SCNC: 139 MMOL/L — SIGNIFICANT CHANGE UP (ref 135–146)
SODIUM SERPL-SCNC: 141 MMOL/L — SIGNIFICANT CHANGE UP (ref 135–146)
WBC # BLD: 6.48 K/UL — SIGNIFICANT CHANGE UP (ref 4.8–10.8)
WBC # FLD AUTO: 6.48 K/UL — SIGNIFICANT CHANGE UP (ref 4.8–10.8)

## 2025-08-09 PROCEDURE — 99232 SBSQ HOSP IP/OBS MODERATE 35: CPT

## 2025-08-09 RX ORDER — MAGNESIUM SULFATE 500 MG/ML
2 SYRINGE (ML) INJECTION ONCE
Refills: 0 | Status: COMPLETED | OUTPATIENT
Start: 2025-08-09 | End: 2025-08-09

## 2025-08-09 RX ADMIN — Medication 650 MILLIGRAM(S): at 21:29

## 2025-08-09 RX ADMIN — Medication 650 MILLIGRAM(S): at 11:42

## 2025-08-09 RX ADMIN — METOPROLOL SUCCINATE 50 MILLIGRAM(S): 50 TABLET, EXTENDED RELEASE ORAL at 05:26

## 2025-08-09 RX ADMIN — LATANOPROST PF 1 DROP(S): 0.05 SOLUTION/ DROPS OPHTHALMIC at 22:00

## 2025-08-09 RX ADMIN — Medication 50 MILLIEQUIVALENT(S): at 10:44

## 2025-08-09 RX ADMIN — METOPROLOL SUCCINATE 50 MILLIGRAM(S): 50 TABLET, EXTENDED RELEASE ORAL at 13:48

## 2025-08-09 RX ADMIN — APIXABAN 5 MILLIGRAM(S): 5 TABLET, FILM COATED ORAL at 05:26

## 2025-08-09 RX ADMIN — ATORVASTATIN CALCIUM 20 MILLIGRAM(S): 80 TABLET, FILM COATED ORAL at 21:24

## 2025-08-09 RX ADMIN — Medication 650 MILLIGRAM(S): at 13:45

## 2025-08-09 RX ADMIN — FUROSEMIDE 40 MILLIGRAM(S): 10 INJECTION INTRAMUSCULAR; INTRAVENOUS at 05:27

## 2025-08-09 RX ADMIN — METOPROLOL SUCCINATE 50 MILLIGRAM(S): 50 TABLET, EXTENDED RELEASE ORAL at 00:54

## 2025-08-09 RX ADMIN — Medication 40 MILLIEQUIVALENT(S): at 13:48

## 2025-08-09 RX ADMIN — METOPROLOL SUCCINATE 50 MILLIGRAM(S): 50 TABLET, EXTENDED RELEASE ORAL at 23:58

## 2025-08-09 RX ADMIN — METOPROLOL SUCCINATE 50 MILLIGRAM(S): 50 TABLET, EXTENDED RELEASE ORAL at 17:40

## 2025-08-09 RX ADMIN — AMLODIPINE BESYLATE 5 MILLIGRAM(S): 10 TABLET ORAL at 05:27

## 2025-08-09 RX ADMIN — APIXABAN 5 MILLIGRAM(S): 5 TABLET, FILM COATED ORAL at 17:41

## 2025-08-09 RX ADMIN — Medication 650 MILLIGRAM(S): at 22:29

## 2025-08-09 RX ADMIN — Medication 1 APPLICATION(S): at 05:31

## 2025-08-10 LAB
ANION GAP SERPL CALC-SCNC: 13 MMOL/L — SIGNIFICANT CHANGE UP (ref 7–14)
BASOPHILS # BLD AUTO: 0.04 K/UL — SIGNIFICANT CHANGE UP (ref 0–0.2)
BASOPHILS NFR BLD AUTO: 0.6 % — SIGNIFICANT CHANGE UP (ref 0–1)
BUN SERPL-MCNC: 21 MG/DL — HIGH (ref 10–20)
CALCIUM SERPL-MCNC: 9.5 MG/DL — SIGNIFICANT CHANGE UP (ref 8.4–10.5)
CHLORIDE SERPL-SCNC: 104 MMOL/L — SIGNIFICANT CHANGE UP (ref 98–110)
CO2 SERPL-SCNC: 24 MMOL/L — SIGNIFICANT CHANGE UP (ref 17–32)
CREAT SERPL-MCNC: 0.9 MG/DL — SIGNIFICANT CHANGE UP (ref 0.7–1.5)
EGFR: 65 ML/MIN/1.73M2 — SIGNIFICANT CHANGE UP
EGFR: 65 ML/MIN/1.73M2 — SIGNIFICANT CHANGE UP
EOSINOPHIL # BLD AUTO: 0.17 K/UL — SIGNIFICANT CHANGE UP (ref 0–0.7)
EOSINOPHIL NFR BLD AUTO: 2.6 % — SIGNIFICANT CHANGE UP (ref 0–8)
GLUCOSE SERPL-MCNC: 126 MG/DL — HIGH (ref 70–99)
HCT VFR BLD CALC: 40.9 % — SIGNIFICANT CHANGE UP (ref 37–47)
HGB BLD-MCNC: 14.4 G/DL — SIGNIFICANT CHANGE UP (ref 12–16)
IMM GRANULOCYTES NFR BLD AUTO: 0.2 % — SIGNIFICANT CHANGE UP (ref 0.1–0.3)
LYMPHOCYTES # BLD AUTO: 2.36 K/UL — SIGNIFICANT CHANGE UP (ref 1.2–3.4)
LYMPHOCYTES # BLD AUTO: 36.4 % — SIGNIFICANT CHANGE UP (ref 20.5–51.1)
MAGNESIUM SERPL-MCNC: 1.9 MG/DL — SIGNIFICANT CHANGE UP (ref 1.8–2.4)
MCHC RBC-ENTMCNC: 30.3 PG — SIGNIFICANT CHANGE UP (ref 27–31)
MCHC RBC-ENTMCNC: 35.2 G/DL — SIGNIFICANT CHANGE UP (ref 32–37)
MCV RBC AUTO: 85.9 FL — SIGNIFICANT CHANGE UP (ref 81–99)
MONOCYTES # BLD AUTO: 0.71 K/UL — HIGH (ref 0.1–0.6)
MONOCYTES NFR BLD AUTO: 11 % — HIGH (ref 1.7–9.3)
NEUTROPHILS # BLD AUTO: 3.19 K/UL — SIGNIFICANT CHANGE UP (ref 1.4–6.5)
NEUTROPHILS NFR BLD AUTO: 49.2 % — SIGNIFICANT CHANGE UP (ref 42.2–75.2)
NRBC BLD AUTO-RTO: 0 /100 WBCS — SIGNIFICANT CHANGE UP (ref 0–0)
PLATELET # BLD AUTO: 256 K/UL — SIGNIFICANT CHANGE UP (ref 130–400)
PMV BLD: 11.8 FL — HIGH (ref 7.4–10.4)
POTASSIUM SERPL-MCNC: 3.9 MMOL/L — SIGNIFICANT CHANGE UP (ref 3.5–5)
POTASSIUM SERPL-SCNC: 3.9 MMOL/L — SIGNIFICANT CHANGE UP (ref 3.5–5)
RBC # BLD: 4.76 M/UL — SIGNIFICANT CHANGE UP (ref 4.2–5.4)
RBC # FLD: 13.1 % — SIGNIFICANT CHANGE UP (ref 11.5–14.5)
SODIUM SERPL-SCNC: 141 MMOL/L — SIGNIFICANT CHANGE UP (ref 135–146)
WBC # BLD: 6.48 K/UL — SIGNIFICANT CHANGE UP (ref 4.8–10.8)
WBC # FLD AUTO: 6.48 K/UL — SIGNIFICANT CHANGE UP (ref 4.8–10.8)

## 2025-08-10 PROCEDURE — 99233 SBSQ HOSP IP/OBS HIGH 50: CPT

## 2025-08-10 RX ORDER — METOPROLOL SUCCINATE 50 MG/1
75 TABLET, EXTENDED RELEASE ORAL THREE TIMES A DAY
Refills: 0 | Status: DISCONTINUED | OUTPATIENT
Start: 2025-08-10 | End: 2025-08-14

## 2025-08-10 RX ORDER — MAGNESIUM OXIDE 400 MG
400 TABLET ORAL
Refills: 0 | Status: DISCONTINUED | OUTPATIENT
Start: 2025-08-10 | End: 2025-08-14

## 2025-08-10 RX ADMIN — ATORVASTATIN CALCIUM 20 MILLIGRAM(S): 80 TABLET, FILM COATED ORAL at 21:28

## 2025-08-10 RX ADMIN — APIXABAN 5 MILLIGRAM(S): 5 TABLET, FILM COATED ORAL at 17:01

## 2025-08-10 RX ADMIN — Medication 400 MILLIGRAM(S): at 17:02

## 2025-08-10 RX ADMIN — METOPROLOL SUCCINATE 75 MILLIGRAM(S): 50 TABLET, EXTENDED RELEASE ORAL at 14:03

## 2025-08-10 RX ADMIN — FUROSEMIDE 40 MILLIGRAM(S): 10 INJECTION INTRAMUSCULAR; INTRAVENOUS at 05:44

## 2025-08-10 RX ADMIN — Medication 1 APPLICATION(S): at 05:48

## 2025-08-10 RX ADMIN — APIXABAN 5 MILLIGRAM(S): 5 TABLET, FILM COATED ORAL at 05:44

## 2025-08-10 RX ADMIN — LATANOPROST PF 1 DROP(S): 0.05 SOLUTION/ DROPS OPHTHALMIC at 21:29

## 2025-08-10 RX ADMIN — AMLODIPINE BESYLATE 5 MILLIGRAM(S): 10 TABLET ORAL at 05:44

## 2025-08-10 RX ADMIN — METOPROLOL SUCCINATE 75 MILLIGRAM(S): 50 TABLET, EXTENDED RELEASE ORAL at 21:29

## 2025-08-10 RX ADMIN — METOPROLOL SUCCINATE 50 MILLIGRAM(S): 50 TABLET, EXTENDED RELEASE ORAL at 05:44

## 2025-08-10 RX ADMIN — Medication 400 MILLIGRAM(S): at 12:12

## 2025-08-11 ENCOUNTER — RESULT REVIEW (OUTPATIENT)
Age: 80
End: 2025-08-11

## 2025-08-11 ENCOUNTER — TRANSCRIPTION ENCOUNTER (OUTPATIENT)
Age: 80
End: 2025-08-11

## 2025-08-11 LAB
ANION GAP SERPL CALC-SCNC: 16 MMOL/L — HIGH (ref 7–14)
BASOPHILS # BLD AUTO: 0.06 K/UL — SIGNIFICANT CHANGE UP (ref 0–0.2)
BASOPHILS NFR BLD AUTO: 0.7 % — SIGNIFICANT CHANGE UP (ref 0–1)
BUN SERPL-MCNC: 24 MG/DL — HIGH (ref 10–20)
CALCIUM SERPL-MCNC: 9.7 MG/DL — SIGNIFICANT CHANGE UP (ref 8.4–10.5)
CHLORIDE SERPL-SCNC: 102 MMOL/L — SIGNIFICANT CHANGE UP (ref 98–110)
CO2 SERPL-SCNC: 24 MMOL/L — SIGNIFICANT CHANGE UP (ref 17–32)
CREAT SERPL-MCNC: 1.1 MG/DL — SIGNIFICANT CHANGE UP (ref 0.7–1.5)
EGFR: 51 ML/MIN/1.73M2 — LOW
EGFR: 51 ML/MIN/1.73M2 — LOW
EOSINOPHIL # BLD AUTO: 0.21 K/UL — SIGNIFICANT CHANGE UP (ref 0–0.7)
EOSINOPHIL NFR BLD AUTO: 2.6 % — SIGNIFICANT CHANGE UP (ref 0–8)
GLUCOSE SERPL-MCNC: 120 MG/DL — HIGH (ref 70–99)
HCT VFR BLD CALC: 41.3 % — SIGNIFICANT CHANGE UP (ref 37–47)
HGB BLD-MCNC: 14.3 G/DL — SIGNIFICANT CHANGE UP (ref 12–16)
IMM GRANULOCYTES NFR BLD AUTO: 0.2 % — SIGNIFICANT CHANGE UP (ref 0.1–0.3)
LYMPHOCYTES # BLD AUTO: 2.86 K/UL — SIGNIFICANT CHANGE UP (ref 1.2–3.4)
LYMPHOCYTES # BLD AUTO: 35.2 % — SIGNIFICANT CHANGE UP (ref 20.5–51.1)
MAGNESIUM SERPL-MCNC: 2 MG/DL — SIGNIFICANT CHANGE UP (ref 1.8–2.4)
MCHC RBC-ENTMCNC: 29.9 PG — SIGNIFICANT CHANGE UP (ref 27–31)
MCHC RBC-ENTMCNC: 34.6 G/DL — SIGNIFICANT CHANGE UP (ref 32–37)
MCV RBC AUTO: 86.4 FL — SIGNIFICANT CHANGE UP (ref 81–99)
MONOCYTES # BLD AUTO: 0.8 K/UL — HIGH (ref 0.1–0.6)
MONOCYTES NFR BLD AUTO: 9.8 % — HIGH (ref 1.7–9.3)
NEUTROPHILS # BLD AUTO: 4.18 K/UL — SIGNIFICANT CHANGE UP (ref 1.4–6.5)
NEUTROPHILS NFR BLD AUTO: 51.5 % — SIGNIFICANT CHANGE UP (ref 42.2–75.2)
NRBC BLD AUTO-RTO: 0 /100 WBCS — SIGNIFICANT CHANGE UP (ref 0–0)
PLATELET # BLD AUTO: 266 K/UL — SIGNIFICANT CHANGE UP (ref 130–400)
PMV BLD: 11.7 FL — HIGH (ref 7.4–10.4)
POTASSIUM SERPL-MCNC: 4 MMOL/L — SIGNIFICANT CHANGE UP (ref 3.5–5)
POTASSIUM SERPL-SCNC: 4 MMOL/L — SIGNIFICANT CHANGE UP (ref 3.5–5)
RBC # BLD: 4.78 M/UL — SIGNIFICANT CHANGE UP (ref 4.2–5.4)
RBC # FLD: 13.1 % — SIGNIFICANT CHANGE UP (ref 11.5–14.5)
SODIUM SERPL-SCNC: 142 MMOL/L — SIGNIFICANT CHANGE UP (ref 135–146)
WBC # BLD: 8.13 K/UL — SIGNIFICANT CHANGE UP (ref 4.8–10.8)
WBC # FLD AUTO: 8.13 K/UL — SIGNIFICANT CHANGE UP (ref 4.8–10.8)

## 2025-08-11 PROCEDURE — 93325 DOPPLER ECHO COLOR FLOW MAPG: CPT | Mod: 26

## 2025-08-11 PROCEDURE — 93320 DOPPLER ECHO COMPLETE: CPT | Mod: 26

## 2025-08-11 PROCEDURE — 93312 ECHO TRANSESOPHAGEAL: CPT | Mod: 26

## 2025-08-11 PROCEDURE — 99233 SBSQ HOSP IP/OBS HIGH 50: CPT

## 2025-08-11 RX ORDER — ENOXAPARIN SODIUM 100 MG/ML
80 INJECTION SUBCUTANEOUS EVERY 12 HOURS
Refills: 0 | Status: DISCONTINUED | OUTPATIENT
Start: 2025-08-11 | End: 2025-08-12

## 2025-08-11 RX ORDER — DILTIAZEM HYDROCHLORIDE 240 MG/1
30 TABLET, EXTENDED RELEASE ORAL
Refills: 0 | Status: DISCONTINUED | OUTPATIENT
Start: 2025-08-11 | End: 2025-08-12

## 2025-08-11 RX ADMIN — Medication 1 APPLICATION(S): at 05:16

## 2025-08-11 RX ADMIN — Medication 400 MILLIGRAM(S): at 17:11

## 2025-08-11 RX ADMIN — APIXABAN 5 MILLIGRAM(S): 5 TABLET, FILM COATED ORAL at 05:13

## 2025-08-11 RX ADMIN — METOPROLOL SUCCINATE 75 MILLIGRAM(S): 50 TABLET, EXTENDED RELEASE ORAL at 21:04

## 2025-08-11 RX ADMIN — ATORVASTATIN CALCIUM 20 MILLIGRAM(S): 80 TABLET, FILM COATED ORAL at 21:04

## 2025-08-11 RX ADMIN — DILTIAZEM HYDROCHLORIDE 30 MILLIGRAM(S): 240 TABLET, EXTENDED RELEASE ORAL at 17:11

## 2025-08-11 RX ADMIN — DILTIAZEM HYDROCHLORIDE 30 MILLIGRAM(S): 240 TABLET, EXTENDED RELEASE ORAL at 11:44

## 2025-08-11 RX ADMIN — METOPROLOL SUCCINATE 75 MILLIGRAM(S): 50 TABLET, EXTENDED RELEASE ORAL at 05:13

## 2025-08-11 RX ADMIN — METOPROLOL SUCCINATE 75 MILLIGRAM(S): 50 TABLET, EXTENDED RELEASE ORAL at 13:07

## 2025-08-11 RX ADMIN — FUROSEMIDE 40 MILLIGRAM(S): 10 INJECTION INTRAMUSCULAR; INTRAVENOUS at 05:13

## 2025-08-11 RX ADMIN — LATANOPROST PF 1 DROP(S): 0.05 SOLUTION/ DROPS OPHTHALMIC at 21:05

## 2025-08-11 RX ADMIN — DILTIAZEM HYDROCHLORIDE 30 MILLIGRAM(S): 240 TABLET, EXTENDED RELEASE ORAL at 23:51

## 2025-08-11 RX ADMIN — AMLODIPINE BESYLATE 5 MILLIGRAM(S): 10 TABLET ORAL at 05:13

## 2025-08-11 RX ADMIN — Medication 400 MILLIGRAM(S): at 11:44

## 2025-08-11 RX ADMIN — ENOXAPARIN SODIUM 80 MILLIGRAM(S): 100 INJECTION SUBCUTANEOUS at 17:11

## 2025-08-12 LAB
ANION GAP SERPL CALC-SCNC: 13 MMOL/L — SIGNIFICANT CHANGE UP (ref 7–14)
BASOPHILS # BLD AUTO: 0.05 K/UL — SIGNIFICANT CHANGE UP (ref 0–0.2)
BASOPHILS NFR BLD AUTO: 0.7 % — SIGNIFICANT CHANGE UP (ref 0–1)
BUN SERPL-MCNC: 27 MG/DL — HIGH (ref 10–20)
CALCIUM SERPL-MCNC: 9.6 MG/DL — SIGNIFICANT CHANGE UP (ref 8.4–10.5)
CHLORIDE SERPL-SCNC: 103 MMOL/L — SIGNIFICANT CHANGE UP (ref 98–110)
CO2 SERPL-SCNC: 27 MMOL/L — SIGNIFICANT CHANGE UP (ref 17–32)
CREAT SERPL-MCNC: 1 MG/DL — SIGNIFICANT CHANGE UP (ref 0.7–1.5)
EGFR: 58 ML/MIN/1.73M2 — LOW
EGFR: 58 ML/MIN/1.73M2 — LOW
EOSINOPHIL # BLD AUTO: 0.14 K/UL — SIGNIFICANT CHANGE UP (ref 0–0.7)
EOSINOPHIL NFR BLD AUTO: 1.9 % — SIGNIFICANT CHANGE UP (ref 0–8)
GLUCOSE SERPL-MCNC: 116 MG/DL — HIGH (ref 70–99)
HCT VFR BLD CALC: 43.4 % — SIGNIFICANT CHANGE UP (ref 37–47)
HGB BLD-MCNC: 14.6 G/DL — SIGNIFICANT CHANGE UP (ref 12–16)
IMM GRANULOCYTES NFR BLD AUTO: 0.1 % — SIGNIFICANT CHANGE UP (ref 0.1–0.3)
LYMPHOCYTES # BLD AUTO: 2.99 K/UL — SIGNIFICANT CHANGE UP (ref 1.2–3.4)
LYMPHOCYTES # BLD AUTO: 40.1 % — SIGNIFICANT CHANGE UP (ref 20.5–51.1)
MAGNESIUM SERPL-MCNC: 2.2 MG/DL — SIGNIFICANT CHANGE UP (ref 1.8–2.4)
MCHC RBC-ENTMCNC: 29.5 PG — SIGNIFICANT CHANGE UP (ref 27–31)
MCHC RBC-ENTMCNC: 33.6 G/DL — SIGNIFICANT CHANGE UP (ref 32–37)
MCV RBC AUTO: 87.7 FL — SIGNIFICANT CHANGE UP (ref 81–99)
MONOCYTES # BLD AUTO: 0.74 K/UL — HIGH (ref 0.1–0.6)
MONOCYTES NFR BLD AUTO: 9.9 % — HIGH (ref 1.7–9.3)
NEUTROPHILS # BLD AUTO: 3.53 K/UL — SIGNIFICANT CHANGE UP (ref 1.4–6.5)
NEUTROPHILS NFR BLD AUTO: 47.3 % — SIGNIFICANT CHANGE UP (ref 42.2–75.2)
NRBC BLD AUTO-RTO: 0 /100 WBCS — SIGNIFICANT CHANGE UP (ref 0–0)
PLATELET # BLD AUTO: 266 K/UL — SIGNIFICANT CHANGE UP (ref 130–400)
PMV BLD: 11.9 FL — HIGH (ref 7.4–10.4)
POTASSIUM SERPL-MCNC: 3.6 MMOL/L — SIGNIFICANT CHANGE UP (ref 3.5–5)
POTASSIUM SERPL-SCNC: 3.6 MMOL/L — SIGNIFICANT CHANGE UP (ref 3.5–5)
RBC # BLD: 4.95 M/UL — SIGNIFICANT CHANGE UP (ref 4.2–5.4)
RBC # FLD: 13 % — SIGNIFICANT CHANGE UP (ref 11.5–14.5)
SODIUM SERPL-SCNC: 143 MMOL/L — SIGNIFICANT CHANGE UP (ref 135–146)
WBC # BLD: 7.46 K/UL — SIGNIFICANT CHANGE UP (ref 4.8–10.8)
WBC # FLD AUTO: 7.46 K/UL — SIGNIFICANT CHANGE UP (ref 4.8–10.8)

## 2025-08-12 PROCEDURE — 99222 1ST HOSP IP/OBS MODERATE 55: CPT

## 2025-08-12 PROCEDURE — 99233 SBSQ HOSP IP/OBS HIGH 50: CPT

## 2025-08-12 RX ORDER — DILTIAZEM HYDROCHLORIDE 240 MG/1
60 TABLET, EXTENDED RELEASE ORAL
Refills: 0 | Status: DISCONTINUED | OUTPATIENT
Start: 2025-08-12 | End: 2025-08-13

## 2025-08-12 RX ORDER — DILTIAZEM HYDROCHLORIDE 240 MG/1
30 TABLET, EXTENDED RELEASE ORAL ONCE
Refills: 0 | Status: COMPLETED | OUTPATIENT
Start: 2025-08-12 | End: 2025-08-12

## 2025-08-12 RX ORDER — APIXABAN 5 MG/1
5 TABLET, FILM COATED ORAL EVERY 12 HOURS
Refills: 0 | Status: DISCONTINUED | OUTPATIENT
Start: 2025-08-12 | End: 2025-08-14

## 2025-08-12 RX ADMIN — METOPROLOL SUCCINATE 75 MILLIGRAM(S): 50 TABLET, EXTENDED RELEASE ORAL at 05:29

## 2025-08-12 RX ADMIN — Medication 40 MILLIEQUIVALENT(S): at 09:24

## 2025-08-12 RX ADMIN — ENOXAPARIN SODIUM 80 MILLIGRAM(S): 100 INJECTION SUBCUTANEOUS at 05:28

## 2025-08-12 RX ADMIN — Medication 40 MILLIEQUIVALENT(S): at 13:14

## 2025-08-12 RX ADMIN — FUROSEMIDE 40 MILLIGRAM(S): 10 INJECTION INTRAMUSCULAR; INTRAVENOUS at 05:29

## 2025-08-12 RX ADMIN — Medication 400 MILLIGRAM(S): at 08:24

## 2025-08-12 RX ADMIN — METOPROLOL SUCCINATE 75 MILLIGRAM(S): 50 TABLET, EXTENDED RELEASE ORAL at 21:30

## 2025-08-12 RX ADMIN — Medication 400 MILLIGRAM(S): at 17:02

## 2025-08-12 RX ADMIN — Medication 1 APPLICATION(S): at 05:31

## 2025-08-12 RX ADMIN — DILTIAZEM HYDROCHLORIDE 30 MILLIGRAM(S): 240 TABLET, EXTENDED RELEASE ORAL at 05:28

## 2025-08-12 RX ADMIN — DILTIAZEM HYDROCHLORIDE 60 MILLIGRAM(S): 240 TABLET, EXTENDED RELEASE ORAL at 11:07

## 2025-08-12 RX ADMIN — DILTIAZEM HYDROCHLORIDE 60 MILLIGRAM(S): 240 TABLET, EXTENDED RELEASE ORAL at 17:02

## 2025-08-12 RX ADMIN — ATORVASTATIN CALCIUM 20 MILLIGRAM(S): 80 TABLET, FILM COATED ORAL at 21:31

## 2025-08-12 RX ADMIN — APIXABAN 5 MILLIGRAM(S): 5 TABLET, FILM COATED ORAL at 17:02

## 2025-08-12 RX ADMIN — METOPROLOL SUCCINATE 75 MILLIGRAM(S): 50 TABLET, EXTENDED RELEASE ORAL at 13:14

## 2025-08-12 RX ADMIN — DILTIAZEM HYDROCHLORIDE 30 MILLIGRAM(S): 240 TABLET, EXTENDED RELEASE ORAL at 09:24

## 2025-08-12 RX ADMIN — LATANOPROST PF 1 DROP(S): 0.05 SOLUTION/ DROPS OPHTHALMIC at 21:31

## 2025-08-12 RX ADMIN — Medication 400 MILLIGRAM(S): at 13:13

## 2025-08-13 ENCOUNTER — RESULT REVIEW (OUTPATIENT)
Age: 80
End: 2025-08-13

## 2025-08-13 LAB
ANION GAP SERPL CALC-SCNC: 9 MMOL/L — SIGNIFICANT CHANGE UP (ref 7–14)
BASOPHILS # BLD AUTO: 0.05 K/UL — SIGNIFICANT CHANGE UP (ref 0–0.2)
BASOPHILS NFR BLD AUTO: 0.7 % — SIGNIFICANT CHANGE UP (ref 0–1)
BUN SERPL-MCNC: 25 MG/DL — HIGH (ref 10–20)
CALCIUM SERPL-MCNC: 9.4 MG/DL — SIGNIFICANT CHANGE UP (ref 8.4–10.5)
CHLORIDE SERPL-SCNC: 99 MMOL/L — SIGNIFICANT CHANGE UP (ref 98–110)
CO2 SERPL-SCNC: 26 MMOL/L — SIGNIFICANT CHANGE UP (ref 17–32)
CREAT SERPL-MCNC: 1 MG/DL — SIGNIFICANT CHANGE UP (ref 0.7–1.5)
EGFR: 58 ML/MIN/1.73M2 — LOW
EGFR: 58 ML/MIN/1.73M2 — LOW
EOSINOPHIL # BLD AUTO: 0.17 K/UL — SIGNIFICANT CHANGE UP (ref 0–0.7)
EOSINOPHIL NFR BLD AUTO: 2.4 % — SIGNIFICANT CHANGE UP (ref 0–8)
GLUCOSE SERPL-MCNC: 130 MG/DL — HIGH (ref 70–99)
HCT VFR BLD CALC: 39.2 % — SIGNIFICANT CHANGE UP (ref 37–47)
HGB BLD-MCNC: 13.7 G/DL — SIGNIFICANT CHANGE UP (ref 12–16)
IMM GRANULOCYTES NFR BLD AUTO: 0.1 % — SIGNIFICANT CHANGE UP (ref 0.1–0.3)
LYMPHOCYTES # BLD AUTO: 2.34 K/UL — SIGNIFICANT CHANGE UP (ref 1.2–3.4)
LYMPHOCYTES # BLD AUTO: 32.6 % — SIGNIFICANT CHANGE UP (ref 20.5–51.1)
MAGNESIUM SERPL-MCNC: 2.2 MG/DL — SIGNIFICANT CHANGE UP (ref 1.8–2.4)
MCHC RBC-ENTMCNC: 30.1 PG — SIGNIFICANT CHANGE UP (ref 27–31)
MCHC RBC-ENTMCNC: 34.9 G/DL — SIGNIFICANT CHANGE UP (ref 32–37)
MCV RBC AUTO: 86.2 FL — SIGNIFICANT CHANGE UP (ref 81–99)
MONOCYTES # BLD AUTO: 0.78 K/UL — HIGH (ref 0.1–0.6)
MONOCYTES NFR BLD AUTO: 10.9 % — HIGH (ref 1.7–9.3)
NEUTROPHILS # BLD AUTO: 3.82 K/UL — SIGNIFICANT CHANGE UP (ref 1.4–6.5)
NEUTROPHILS NFR BLD AUTO: 53.3 % — SIGNIFICANT CHANGE UP (ref 42.2–75.2)
NRBC BLD AUTO-RTO: 0 /100 WBCS — SIGNIFICANT CHANGE UP (ref 0–0)
PLATELET # BLD AUTO: 251 K/UL — SIGNIFICANT CHANGE UP (ref 130–400)
PMV BLD: 11.4 FL — HIGH (ref 7.4–10.4)
POTASSIUM SERPL-MCNC: 4.1 MMOL/L — SIGNIFICANT CHANGE UP (ref 3.5–5)
POTASSIUM SERPL-SCNC: 4.1 MMOL/L — SIGNIFICANT CHANGE UP (ref 3.5–5)
RBC # BLD: 4.55 M/UL — SIGNIFICANT CHANGE UP (ref 4.2–5.4)
RBC # FLD: 13 % — SIGNIFICANT CHANGE UP (ref 11.5–14.5)
SODIUM SERPL-SCNC: 134 MMOL/L — LOW (ref 135–146)
WBC # BLD: 7.17 K/UL — SIGNIFICANT CHANGE UP (ref 4.8–10.8)
WBC # FLD AUTO: 7.17 K/UL — SIGNIFICANT CHANGE UP (ref 4.8–10.8)

## 2025-08-13 PROCEDURE — 99239 HOSP IP/OBS DSCHRG MGMT >30: CPT

## 2025-08-13 PROCEDURE — 93308 TTE F-UP OR LMTD: CPT | Mod: 26

## 2025-08-13 RX ORDER — DIGOXIN 250 UG/1
500 TABLET ORAL ONCE
Refills: 0 | Status: COMPLETED | OUTPATIENT
Start: 2025-08-13 | End: 2025-08-13

## 2025-08-13 RX ORDER — ATORVASTATIN CALCIUM 80 MG/1
1 TABLET, FILM COATED ORAL
Qty: 30 | Refills: 3
Start: 2025-08-13 | End: 2025-12-10

## 2025-08-13 RX ORDER — DILTIAZEM HYDROCHLORIDE 240 MG/1
1 TABLET, EXTENDED RELEASE ORAL
Qty: 30 | Refills: 3
Start: 2025-08-13 | End: 2025-12-10

## 2025-08-13 RX ORDER — METOPROLOL SUCCINATE 50 MG/1
1 TABLET, EXTENDED RELEASE ORAL
Qty: 30 | Refills: 3
Start: 2025-08-13 | End: 2025-12-10

## 2025-08-13 RX ORDER — APIXABAN 5 MG/1
1 TABLET, FILM COATED ORAL
Qty: 60 | Refills: 0
Start: 2025-08-13 | End: 2025-09-11

## 2025-08-13 RX ORDER — FUROSEMIDE 10 MG/ML
1 INJECTION INTRAMUSCULAR; INTRAVENOUS
Qty: 30 | Refills: 0
Start: 2025-08-13 | End: 2025-09-11

## 2025-08-13 RX ORDER — DIGOXIN 250 UG/1
250 TABLET ORAL DAILY
Refills: 0 | Status: DISCONTINUED | OUTPATIENT
Start: 2025-08-14 | End: 2025-08-14

## 2025-08-13 RX ORDER — DIGOXIN 250 UG/1
250 TABLET ORAL EVERY 6 HOURS
Refills: 0 | Status: COMPLETED | OUTPATIENT
Start: 2025-08-13 | End: 2025-08-14

## 2025-08-13 RX ORDER — BIMATOPROST 0.3 MG/ML
1 SOLUTION/ DROPS OPHTHALMIC
Qty: 1 | Refills: 3
Start: 2025-08-13 | End: 2025-12-10

## 2025-08-13 RX ADMIN — METOPROLOL SUCCINATE 75 MILLIGRAM(S): 50 TABLET, EXTENDED RELEASE ORAL at 21:07

## 2025-08-13 RX ADMIN — Medication 400 MILLIGRAM(S): at 13:32

## 2025-08-13 RX ADMIN — Medication 1 APPLICATION(S): at 05:11

## 2025-08-13 RX ADMIN — DIGOXIN 500 MICROGRAM(S): 250 TABLET ORAL at 15:29

## 2025-08-13 RX ADMIN — METOPROLOL SUCCINATE 75 MILLIGRAM(S): 50 TABLET, EXTENDED RELEASE ORAL at 05:07

## 2025-08-13 RX ADMIN — LATANOPROST PF 1 DROP(S): 0.05 SOLUTION/ DROPS OPHTHALMIC at 21:55

## 2025-08-13 RX ADMIN — ATORVASTATIN CALCIUM 20 MILLIGRAM(S): 80 TABLET, FILM COATED ORAL at 21:07

## 2025-08-13 RX ADMIN — APIXABAN 5 MILLIGRAM(S): 5 TABLET, FILM COATED ORAL at 17:07

## 2025-08-13 RX ADMIN — Medication 400 MILLIGRAM(S): at 17:08

## 2025-08-13 RX ADMIN — DILTIAZEM HYDROCHLORIDE 60 MILLIGRAM(S): 240 TABLET, EXTENDED RELEASE ORAL at 13:32

## 2025-08-13 RX ADMIN — DILTIAZEM HYDROCHLORIDE 60 MILLIGRAM(S): 240 TABLET, EXTENDED RELEASE ORAL at 00:46

## 2025-08-13 RX ADMIN — METOPROLOL SUCCINATE 75 MILLIGRAM(S): 50 TABLET, EXTENDED RELEASE ORAL at 13:32

## 2025-08-13 RX ADMIN — DILTIAZEM HYDROCHLORIDE 60 MILLIGRAM(S): 240 TABLET, EXTENDED RELEASE ORAL at 05:07

## 2025-08-13 RX ADMIN — FUROSEMIDE 40 MILLIGRAM(S): 10 INJECTION INTRAMUSCULAR; INTRAVENOUS at 05:07

## 2025-08-13 RX ADMIN — APIXABAN 5 MILLIGRAM(S): 5 TABLET, FILM COATED ORAL at 05:07

## 2025-08-13 RX ADMIN — Medication 3 MILLIGRAM(S): at 21:07

## 2025-08-13 RX ADMIN — DIGOXIN 250 MICROGRAM(S): 250 TABLET ORAL at 20:34

## 2025-08-13 RX ADMIN — Medication 400 MILLIGRAM(S): at 08:16

## 2025-08-14 ENCOUNTER — TRANSCRIPTION ENCOUNTER (OUTPATIENT)
Age: 80
End: 2025-08-14

## 2025-08-14 VITALS
OXYGEN SATURATION: 97 % | SYSTOLIC BLOOD PRESSURE: 125 MMHG | TEMPERATURE: 98 F | HEART RATE: 116 BPM | RESPIRATION RATE: 18 BRPM | DIASTOLIC BLOOD PRESSURE: 91 MMHG

## 2025-08-14 LAB
ANION GAP SERPL CALC-SCNC: 13 MMOL/L — SIGNIFICANT CHANGE UP (ref 7–14)
BASOPHILS # BLD AUTO: 0.05 K/UL — SIGNIFICANT CHANGE UP (ref 0–0.2)
BASOPHILS NFR BLD AUTO: 0.8 % — SIGNIFICANT CHANGE UP (ref 0–1)
BUN SERPL-MCNC: 25 MG/DL — HIGH (ref 10–20)
CALCIUM SERPL-MCNC: 9.3 MG/DL — SIGNIFICANT CHANGE UP (ref 8.4–10.5)
CHLORIDE SERPL-SCNC: 105 MMOL/L — SIGNIFICANT CHANGE UP (ref 98–110)
CO2 SERPL-SCNC: 23 MMOL/L — SIGNIFICANT CHANGE UP (ref 17–32)
CREAT SERPL-MCNC: 1 MG/DL — SIGNIFICANT CHANGE UP (ref 0.7–1.5)
EGFR: 58 ML/MIN/1.73M2 — LOW
EGFR: 58 ML/MIN/1.73M2 — LOW
EOSINOPHIL # BLD AUTO: 0.17 K/UL — SIGNIFICANT CHANGE UP (ref 0–0.7)
EOSINOPHIL NFR BLD AUTO: 2.7 % — SIGNIFICANT CHANGE UP (ref 0–8)
GLUCOSE SERPL-MCNC: 111 MG/DL — HIGH (ref 70–99)
HCT VFR BLD CALC: 38.9 % — SIGNIFICANT CHANGE UP (ref 37–47)
HGB BLD-MCNC: 13.5 G/DL — SIGNIFICANT CHANGE UP (ref 12–16)
IMM GRANULOCYTES NFR BLD AUTO: 0.3 % — SIGNIFICANT CHANGE UP (ref 0.1–0.3)
LYMPHOCYTES # BLD AUTO: 2.34 K/UL — SIGNIFICANT CHANGE UP (ref 1.2–3.4)
LYMPHOCYTES # BLD AUTO: 37 % — SIGNIFICANT CHANGE UP (ref 20.5–51.1)
MAGNESIUM SERPL-MCNC: 2.4 MG/DL — SIGNIFICANT CHANGE UP (ref 1.8–2.4)
MCHC RBC-ENTMCNC: 30.1 PG — SIGNIFICANT CHANGE UP (ref 27–31)
MCHC RBC-ENTMCNC: 34.7 G/DL — SIGNIFICANT CHANGE UP (ref 32–37)
MCV RBC AUTO: 86.8 FL — SIGNIFICANT CHANGE UP (ref 81–99)
MONOCYTES # BLD AUTO: 0.76 K/UL — HIGH (ref 0.1–0.6)
MONOCYTES NFR BLD AUTO: 12 % — HIGH (ref 1.7–9.3)
NEUTROPHILS # BLD AUTO: 2.98 K/UL — SIGNIFICANT CHANGE UP (ref 1.4–6.5)
NEUTROPHILS NFR BLD AUTO: 47.2 % — SIGNIFICANT CHANGE UP (ref 42.2–75.2)
NRBC BLD AUTO-RTO: 0 /100 WBCS — SIGNIFICANT CHANGE UP (ref 0–0)
PLATELET # BLD AUTO: 244 K/UL — SIGNIFICANT CHANGE UP (ref 130–400)
PMV BLD: 11.9 FL — HIGH (ref 7.4–10.4)
POTASSIUM SERPL-MCNC: 4.3 MMOL/L — SIGNIFICANT CHANGE UP (ref 3.5–5)
POTASSIUM SERPL-SCNC: 4.3 MMOL/L — SIGNIFICANT CHANGE UP (ref 3.5–5)
RBC # BLD: 4.48 M/UL — SIGNIFICANT CHANGE UP (ref 4.2–5.4)
RBC # FLD: 12.8 % — SIGNIFICANT CHANGE UP (ref 11.5–14.5)
SODIUM SERPL-SCNC: 141 MMOL/L — SIGNIFICANT CHANGE UP (ref 135–146)
WBC # BLD: 6.32 K/UL — SIGNIFICANT CHANGE UP (ref 4.8–10.8)
WBC # FLD AUTO: 6.32 K/UL — SIGNIFICANT CHANGE UP (ref 4.8–10.8)

## 2025-08-14 PROCEDURE — 99232 SBSQ HOSP IP/OBS MODERATE 35: CPT

## 2025-08-14 RX ORDER — DIGOXIN 250 UG/1
1 TABLET ORAL
Qty: 30 | Refills: 3
Start: 2025-08-14 | End: 2025-12-11

## 2025-08-14 RX ORDER — METOPROLOL SUCCINATE 50 MG/1
150 TABLET, EXTENDED RELEASE ORAL ONCE
Refills: 0 | Status: COMPLETED | OUTPATIENT
Start: 2025-08-14 | End: 2025-08-14

## 2025-08-14 RX ADMIN — DIGOXIN 250 MICROGRAM(S): 250 TABLET ORAL at 05:45

## 2025-08-14 RX ADMIN — Medication 1 APPLICATION(S): at 05:34

## 2025-08-14 RX ADMIN — APIXABAN 5 MILLIGRAM(S): 5 TABLET, FILM COATED ORAL at 05:34

## 2025-08-14 RX ADMIN — FUROSEMIDE 40 MILLIGRAM(S): 10 INJECTION INTRAMUSCULAR; INTRAVENOUS at 05:34

## 2025-08-14 RX ADMIN — METOPROLOL SUCCINATE 75 MILLIGRAM(S): 50 TABLET, EXTENDED RELEASE ORAL at 05:34

## 2025-08-14 RX ADMIN — METOPROLOL SUCCINATE 150 MILLIGRAM(S): 50 TABLET, EXTENDED RELEASE ORAL at 12:57

## 2025-08-29 DIAGNOSIS — I48.0 PAROXYSMAL ATRIAL FIBRILLATION: ICD-10-CM

## 2025-08-29 DIAGNOSIS — E78.5 HYPERLIPIDEMIA, UNSPECIFIED: ICD-10-CM

## 2025-08-29 DIAGNOSIS — I50.21 ACUTE SYSTOLIC (CONGESTIVE) HEART FAILURE: ICD-10-CM

## 2025-08-29 DIAGNOSIS — I44.39 OTHER ATRIOVENTRICULAR BLOCK: ICD-10-CM

## 2025-08-29 DIAGNOSIS — I48.92 UNSPECIFIED ATRIAL FLUTTER: ICD-10-CM

## 2025-08-29 DIAGNOSIS — I23.6 THROMBOSIS OF ATRIUM, AURICULAR APPENDAGE, AND VENTRICLE AS CURRENT COMPLICATIONS FOLLOWING ACUTE MYOCARDIAL INFARCTION: ICD-10-CM

## 2025-08-29 DIAGNOSIS — Z53.8 PROCEDURE AND TREATMENT NOT CARRIED OUT FOR OTHER REASONS: ICD-10-CM

## 2025-08-29 DIAGNOSIS — Z79.01 LONG TERM (CURRENT) USE OF ANTICOAGULANTS: ICD-10-CM

## 2025-08-29 DIAGNOSIS — E87.70 FLUID OVERLOAD, UNSPECIFIED: ICD-10-CM

## 2025-08-29 DIAGNOSIS — N17.9 ACUTE KIDNEY FAILURE, UNSPECIFIED: ICD-10-CM

## 2025-08-29 DIAGNOSIS — E80.4 GILBERT SYNDROME: ICD-10-CM

## 2025-08-29 DIAGNOSIS — H40.9 UNSPECIFIED GLAUCOMA: ICD-10-CM

## 2025-08-29 DIAGNOSIS — E87.6 HYPOKALEMIA: ICD-10-CM

## 2025-09-08 ENCOUNTER — APPOINTMENT (OUTPATIENT)
Age: 80
End: 2025-09-08